# Patient Record
Sex: FEMALE | Race: WHITE | NOT HISPANIC OR LATINO | Employment: OTHER | URBAN - METROPOLITAN AREA
[De-identification: names, ages, dates, MRNs, and addresses within clinical notes are randomized per-mention and may not be internally consistent; named-entity substitution may affect disease eponyms.]

---

## 2018-03-19 ENCOUNTER — OFFICE VISIT (OUTPATIENT)
Dept: INTERNAL MEDICINE | Facility: CLINIC | Age: 53
End: 2018-03-19

## 2018-03-19 VITALS
HEIGHT: 65 IN | WEIGHT: 150.4 LBS | SYSTOLIC BLOOD PRESSURE: 140 MMHG | DIASTOLIC BLOOD PRESSURE: 84 MMHG | BODY MASS INDEX: 25.06 KG/M2 | TEMPERATURE: 98.7 F

## 2018-03-19 DIAGNOSIS — J45.30 MILD PERSISTENT ASTHMA WITHOUT COMPLICATION: ICD-10-CM

## 2018-03-19 DIAGNOSIS — E03.9 HYPOTHYROIDISM (ACQUIRED): Primary | ICD-10-CM

## 2018-03-19 LAB
25(OH)D3 SERPL-MCNC: 34.5 NG/ML
ALBUMIN SERPL-MCNC: 4.6 G/DL (ref 3.2–4.8)
ALBUMIN/GLOB SERPL: 2.1 G/DL (ref 1.5–2.5)
ALP SERPL-CCNC: 61 U/L (ref 25–100)
ALT SERPL W P-5'-P-CCNC: 40 U/L (ref 7–40)
ANION GAP SERPL CALCULATED.3IONS-SCNC: 11 MMOL/L (ref 3–11)
AST SERPL-CCNC: 25 U/L (ref 0–33)
BASOPHILS # BLD AUTO: 0.05 10*3/MM3 (ref 0–0.2)
BASOPHILS NFR BLD AUTO: 0.8 % (ref 0–1)
BILIRUB SERPL-MCNC: 0.4 MG/DL (ref 0.3–1.2)
BUN BLD-MCNC: 11 MG/DL (ref 9–23)
BUN/CREAT SERPL: 13.8 (ref 7–25)
CALCIUM SPEC-SCNC: 9 MG/DL (ref 8.7–10.4)
CHLORIDE SERPL-SCNC: 101 MMOL/L (ref 99–109)
CO2 SERPL-SCNC: 29 MMOL/L (ref 20–31)
CREAT BLD-MCNC: 0.8 MG/DL (ref 0.6–1.3)
DEPRECATED RDW RBC AUTO: 47.1 FL (ref 37–54)
EOSINOPHIL # BLD AUTO: 0.05 10*3/MM3 (ref 0–0.3)
EOSINOPHIL NFR BLD AUTO: 0.8 % (ref 0–3)
ERYTHROCYTE [DISTWIDTH] IN BLOOD BY AUTOMATED COUNT: 14.2 % (ref 11.3–14.5)
GFR SERPL CREATININE-BSD FRML MDRD: 75 ML/MIN/1.73
GLOBULIN UR ELPH-MCNC: 2.2 GM/DL
GLUCOSE BLD-MCNC: 97 MG/DL (ref 70–100)
HCT VFR BLD AUTO: 45.7 % (ref 34.5–44)
HGB BLD-MCNC: 14.1 G/DL (ref 11.5–15.5)
IMM GRANULOCYTES # BLD: 0 10*3/MM3 (ref 0–0.03)
IMM GRANULOCYTES NFR BLD: 0 % (ref 0–0.6)
LYMPHOCYTES # BLD AUTO: 2.2 10*3/MM3 (ref 0.6–4.8)
LYMPHOCYTES NFR BLD AUTO: 34.4 % (ref 24–44)
MCH RBC QN AUTO: 27.9 PG (ref 27–31)
MCHC RBC AUTO-ENTMCNC: 30.9 G/DL (ref 32–36)
MCV RBC AUTO: 90.5 FL (ref 80–99)
MONOCYTES # BLD AUTO: 0.84 10*3/MM3 (ref 0–1)
MONOCYTES NFR BLD AUTO: 13.1 % (ref 0–12)
NEUTROPHILS # BLD AUTO: 3.26 10*3/MM3 (ref 1.5–8.3)
NEUTROPHILS NFR BLD AUTO: 50.9 % (ref 41–71)
PLATELET # BLD AUTO: 231 10*3/MM3 (ref 150–450)
PMV BLD AUTO: 11.1 FL (ref 6–12)
POTASSIUM BLD-SCNC: 4.3 MMOL/L (ref 3.5–5.5)
PROT SERPL-MCNC: 6.8 G/DL (ref 5.7–8.2)
RBC # BLD AUTO: 5.05 10*6/MM3 (ref 3.89–5.14)
SODIUM BLD-SCNC: 141 MMOL/L (ref 132–146)
T4 FREE SERPL-MCNC: 1.01 NG/DL (ref 0.89–1.76)
TSH SERPL DL<=0.05 MIU/L-ACNC: 0.54 MIU/ML (ref 0.35–5.35)
WBC NRBC COR # BLD: 6.4 10*3/MM3 (ref 3.5–10.8)

## 2018-03-19 PROCEDURE — 86376 MICROSOMAL ANTIBODY EACH: CPT | Performed by: FAMILY MEDICINE

## 2018-03-19 PROCEDURE — 80053 COMPREHEN METABOLIC PANEL: CPT | Performed by: FAMILY MEDICINE

## 2018-03-19 PROCEDURE — 82607 VITAMIN B-12: CPT | Performed by: FAMILY MEDICINE

## 2018-03-19 PROCEDURE — 84443 ASSAY THYROID STIM HORMONE: CPT | Performed by: FAMILY MEDICINE

## 2018-03-19 PROCEDURE — 84439 ASSAY OF FREE THYROXINE: CPT | Performed by: FAMILY MEDICINE

## 2018-03-19 PROCEDURE — 82306 VITAMIN D 25 HYDROXY: CPT | Performed by: FAMILY MEDICINE

## 2018-03-19 PROCEDURE — 99204 OFFICE O/P NEW MOD 45 MIN: CPT | Performed by: FAMILY MEDICINE

## 2018-03-19 PROCEDURE — 85025 COMPLETE CBC W/AUTO DIFF WBC: CPT | Performed by: FAMILY MEDICINE

## 2018-03-19 PROCEDURE — 86800 THYROGLOBULIN ANTIBODY: CPT | Performed by: FAMILY MEDICINE

## 2018-03-19 RX ORDER — LEVOTHYROXINE SODIUM 0.05 MG/1
TABLET ORAL
Qty: 90 TABLET | Refills: 0 | Status: SHIPPED | OUTPATIENT
Start: 2018-03-19 | End: 2018-04-30 | Stop reason: SDUPTHER

## 2018-03-19 RX ORDER — BUPROPION HYDROCHLORIDE 150 MG/1
1 TABLET, EXTENDED RELEASE ORAL 2 TIMES DAILY
COMMUNITY
Start: 2018-01-13 | End: 2018-04-30 | Stop reason: SDUPTHER

## 2018-03-19 RX ORDER — LEVOTHYROXINE, LIOTHYRONINE 9.5; 2.25 UG/1; UG/1
1 TABLET ORAL 3 TIMES DAILY
COMMUNITY
Start: 2018-01-13 | End: 2018-03-19

## 2018-03-19 NOTE — PATIENT INSTRUCTIONS
"1. ENDO- hypothyroid- will change pt to synthroid (T4). Discussed the benefits of moving away from the porcine thyroid products. If indicated, may add cytomel (T3). Will start with synthroid 50. Will get baseline labs today with TSH, free T4 and thyroid antibodies. See below (A).  2. RESP- basic education today. Asthma Action Plan provided in writing. See below (B)  3. RECHECK- 6wk for thyroid and address other issues.         A. Patient education regarding hypothyroidism provided today in layman's terms. Pt advised regarding the location and function of the thyroid (thyroid hormone as a regulator for other body systems). Discussed common signs or symptoms of low thyroid including fatigue, hair loss, weight gain, hoarseness, depression, slow speech, dry skin, brittle nails, feeling cold, constipation and joint/ muscle pain. Also discussed the signs/ symptoms of too much (high) thyroid including fatigue, fast heart rate, weight loss, increased appetite, anxiousness, sweating, muscle aches and loose stools.    Discussed that the goal is to be \"euthryoid\", meaning that the patient has the correct amount of thyroid hormone available. This is accomplished using synthetic hormone, levothyroxine (Synthroid). Discussed that decisions regarding the dose of levothyroxine are made using a lab called TSH (thyroid stimulating hormone) and patient symptoms. The patient is also advised of the importance of taking levothyroxine correctly (on an empty stomach and waiting 1 hour before eating, drinking or taking other medicines) to ensure adequate absorption of the medicine.     B. Asthma education today that asthma is an allergic type reaction in the lungs. Allergies can affect the sinuses (hay fever), lungs (asthma) or skin (atopic dermatitis or eczema). If someone has one area of allergy, they are more likely to have the others as well.    With asthma there are 2 key issues:  1. Spasm in the airways causing wheezing and making it " "difficult to breathe.  2. Swelling the in the airways causing a smaller than usual airway.    Treatments are aimed at various aspects:  1. Albuterol (nebulizer or hand held inhaler- proventil, ventolin, proair, xopenex) is used to stop the airway spasm immediately. It is not long acting but it is fast so it is used for \"rescue\".   2. Inhaled steroids relieve the swelling in the airways. This can take time, often up to 8 hours to start to work so is not for \"rescue\" but can be more effective when there is swelling occurring. These can also be combined with a long acting \"albuterol\" to address both issues; again, this is not for rescue but for more powerful, long term control.  3. Spiriva is an inhaled anticholinergic can help stop airway spasm and over production of mucous/ phlegm.   4. Montelukast (singulair) is a leukotriene blocker and is trying to block the allergy reaction in general. It is taken as a pill can be used for any type of allergy, not just asthma.     Asthma treatment options are determined by the amount of problem a person is having. There are many ways to combine the medicines to simplify things. You should have an Asthma Action Plan in writing that you refer to for specifics on your asthma meds.  "

## 2018-03-19 NOTE — PROGRESS NOTES
"Subjective   Rebeka Harding is a 53 y.o. female.     History of Present Illness   New patient, here to establish. Hx migraine, treated with imitrex in past.     ENDO- hypothyroid- currently on NPThroid. Today pt reports she was diagnosed 4/2017. Was having fatigue, mental slowing, joint pain, etc. Was given HRT which seemed to help at first but then she got worse again with added issues of hair loss and hoarseness. Had normal thyroid labs at her PCP but borderline at [facility] ([facility] Was treated there and all of her symptoms improved. Is on \"natural\" thyroid. Brand was changed due to insurance and she did not feel as well and then stopped the HRT due to cost. Now her symptoms are back. She is taking all 3 doses \"fasting\". Both parents had low thyroid.     RESP- asthma- pt currently on mometasone inhaler with prn ventolin. Today pt reports that in 2007 she had a fungal LLL infection and bronchiectasis. Has had asthma since then.  Is sensitive to aerosol, smoking, etc. Works outside and will use the mometasone 1-2x/wk during peak pollen season when wheezing.     PSYCH- depression with anxiety, currently on wellbutrin 150 SR bid. Has done very well on this long term. Did not do well with the XL as it did not last a full 24 hr.    ORTHO- arthritis- currently on voltaren gel. Pt very active. Has a h/o right knee arthroscopy with menisectomy.     The following portions of the patient's history were reviewed and updated as appropriate: current medications, past family history, past medical history, past social history, past surgical history and problem list.    Review of Systems   Constitutional: Positive for fatigue.   HENT: Positive for hearing loss, rhinorrhea and voice change.    Eyes: Positive for redness and itching.   Cardiovascular: Negative for chest pain.   Gastrointestinal: Positive for constipation. Negative for abdominal distention and abdominal pain.   Endocrine: Positive for polydipsia. " "  Genitourinary: Positive for dysuria and frequency.   Musculoskeletal: Positive for arthralgias, joint swelling and myalgias.        Chronic pain   Skin: Negative for color change.        Change in mole   Neurological: Positive for weakness. Negative for tremors, speech difficulty and headaches.   Psychiatric/Behavioral: Negative for agitation and confusion. The patient is nervous/anxious.    All other systems reviewed and are negative.        Current Outpatient Prescriptions:   •  Albuterol Sulfate (VENTOLIN HFA IN), Inhale., Disp: , Rfl:   •  diclofenac (VOLTAREN) 1 % gel gel, Apply 4 g topically 4 (Four) Times a Day As Needed., Disp: , Rfl:   •  Mometasone Furoate 100 MCG/ACT aerosol, Inhale., Disp: , Rfl:   •  buPROPion SR (WELLBUTRIN SR) 150 MG 12 hr tablet, Take 1 tablet by mouth 2 (Two) Times a Day., Disp: , Rfl:   •  levothyroxine (SYNTHROID, LEVOTHROID) 50 MCG tablet, Take 1 tab po qam fasting, wait 1hr for food or other meds. BRAND ONLY, Disp: 90 tablet, Rfl: 0    Objective     /84   Temp 98.7 °F (37.1 °C)   Ht 165.1 cm (65\")   Wt 68.2 kg (150 lb 6.4 oz)   BMI 25.03 kg/m²     Physical Exam   Constitutional: She is oriented to person, place, and time. She appears well-developed and well-nourished.   HENT:   Right Ear: Tympanic membrane and ear canal normal.   Left Ear: Tympanic membrane and ear canal normal.   Mouth/Throat: Oropharynx is clear and moist.   Eyes: Conjunctivae and EOM are normal. Pupils are equal, round, and reactive to light.   Neck: No thyromegaly present.   Cardiovascular: Normal rate and regular rhythm.    Pulmonary/Chest: Effort normal and breath sounds normal.   Neurological: She is alert and oriented to person, place, and time.   Skin: Skin is warm and dry.   Psychiatric: She has a normal mood and affect.   Vitals reviewed.      Assessment/Plan   Rebeka was seen today for establish care.    Diagnoses and all orders for this visit:    Hypothyroidism (acquired)  -     " levothyroxine (SYNTHROID, LEVOTHROID) 50 MCG tablet; Take 1 tab po qam fasting, wait 1hr for food or other meds. BRAND ONLY  -     Vitamin B12  -     Vitamin D 25 Hydroxy  -     TSH  -     T4, Free  -     Thyroid Antibodies  -     CBC & Differential  -     Comprehensive Metabolic Panel    Mild persistent asthma without complication        1. ENDO- hypothyroid- will change pt to synthroid (T4). Discussed the benefits of moving away from the porcine thyroid products. If indicated, may add cytomel (T3). Will start with synthroid 50. Will get baseline labs today with TSH, free T4 and thyroid antibodies. Will also do fatigue labs. See below (A).  2. RESP- basic education today. Asthma Action Plan provided in writing. See below (B)  3. RECHECK- 6wk for thyroid and address other issues.

## 2018-03-21 LAB
THYROGLOB AB SERPL-ACNC: <1 IU/ML (ref 0–0.9)
THYROPEROXIDASE AB SERPL-ACNC: 17 IU/ML (ref 0–34)

## 2018-03-22 LAB — VIT B12 BLD-MCNC: 564 PG/ML (ref 211–911)

## 2018-04-30 ENCOUNTER — OFFICE VISIT (OUTPATIENT)
Dept: INTERNAL MEDICINE | Facility: CLINIC | Age: 53
End: 2018-04-30

## 2018-04-30 VITALS
WEIGHT: 148.6 LBS | TEMPERATURE: 97.7 F | DIASTOLIC BLOOD PRESSURE: 76 MMHG | SYSTOLIC BLOOD PRESSURE: 122 MMHG | BODY MASS INDEX: 24.76 KG/M2 | HEIGHT: 65 IN

## 2018-04-30 DIAGNOSIS — J30.9 CHRONIC ALLERGIC RHINITIS, UNSPECIFIED SEASONALITY, UNSPECIFIED TRIGGER: ICD-10-CM

## 2018-04-30 DIAGNOSIS — G43.909 MIGRAINE SYNDROME: ICD-10-CM

## 2018-04-30 DIAGNOSIS — E03.9 HYPOTHYROIDISM (ACQUIRED): Primary | ICD-10-CM

## 2018-04-30 DIAGNOSIS — F41.8 DEPRESSION WITH ANXIETY: ICD-10-CM

## 2018-04-30 LAB
T4 FREE SERPL-MCNC: 1.14 NG/DL (ref 0.89–1.76)
TSH SERPL DL<=0.05 MIU/L-ACNC: 0.35 MIU/ML (ref 0.35–5.35)

## 2018-04-30 PROCEDURE — 99214 OFFICE O/P EST MOD 30 MIN: CPT | Performed by: FAMILY MEDICINE

## 2018-04-30 PROCEDURE — 84439 ASSAY OF FREE THYROXINE: CPT | Performed by: FAMILY MEDICINE

## 2018-04-30 PROCEDURE — 84443 ASSAY THYROID STIM HORMONE: CPT | Performed by: FAMILY MEDICINE

## 2018-04-30 RX ORDER — BUPROPION HYDROCHLORIDE 150 MG/1
150 TABLET, EXTENDED RELEASE ORAL 2 TIMES DAILY
Qty: 180 TABLET | Refills: 1 | Status: SHIPPED | OUTPATIENT
Start: 2018-04-30 | End: 2018-12-11 | Stop reason: SDUPTHER

## 2018-04-30 RX ORDER — LEVOTHYROXINE SODIUM 0.05 MG/1
TABLET ORAL
Qty: 90 TABLET | Refills: 0 | Status: SHIPPED | OUTPATIENT
Start: 2018-04-30 | End: 2018-07-30 | Stop reason: SDUPTHER

## 2018-04-30 RX ORDER — MOMETASONE FUROATE 1 MG/ML
SOLUTION TOPICAL DAILY
Qty: 90 ML | Refills: 1 | Status: SHIPPED | OUTPATIENT
Start: 2018-04-30 | End: 2019-05-08 | Stop reason: SDUPTHER

## 2018-04-30 RX ORDER — SUMATRIPTAN 100 MG/1
TABLET, FILM COATED ORAL
Qty: 36 TABLET | Refills: 1 | Status: SHIPPED | OUTPATIENT
Start: 2018-04-30

## 2018-04-30 RX ORDER — FLUTICASONE PROPIONATE 50 MCG
2 SPRAY, SUSPENSION (ML) NASAL DAILY
Qty: 3 BOTTLE | Refills: 0 | Status: SHIPPED | OUTPATIENT
Start: 2018-04-30 | End: 2020-10-28

## 2018-06-28 DIAGNOSIS — E03.9 HYPOTHYROIDISM (ACQUIRED): ICD-10-CM

## 2018-06-28 RX ORDER — LEVOTHYROXINE SODIUM 0.05 MG/1
TABLET ORAL
Qty: 90 TABLET | Refills: 0 | Status: SHIPPED | OUTPATIENT
Start: 2018-06-28 | End: 2018-09-11 | Stop reason: SDUPTHER

## 2018-07-30 ENCOUNTER — OFFICE VISIT (OUTPATIENT)
Dept: INTERNAL MEDICINE | Facility: CLINIC | Age: 53
End: 2018-07-30

## 2018-07-30 VITALS
DIASTOLIC BLOOD PRESSURE: 76 MMHG | BODY MASS INDEX: 24.49 KG/M2 | SYSTOLIC BLOOD PRESSURE: 118 MMHG | TEMPERATURE: 97.9 F | WEIGHT: 147 LBS | HEIGHT: 65 IN

## 2018-07-30 DIAGNOSIS — B00.9 HERPES SIMPLEX: ICD-10-CM

## 2018-07-30 DIAGNOSIS — J45.30 MILD PERSISTENT ASTHMA WITHOUT COMPLICATION: ICD-10-CM

## 2018-07-30 DIAGNOSIS — B96.89 ACUTE BACTERIAL SINUSITIS: ICD-10-CM

## 2018-07-30 DIAGNOSIS — G43.909 MIGRAINE SYNDROME: ICD-10-CM

## 2018-07-30 DIAGNOSIS — M19.90 ARTHRITIS: ICD-10-CM

## 2018-07-30 DIAGNOSIS — E03.9 HYPOTHYROIDISM (ACQUIRED): Primary | ICD-10-CM

## 2018-07-30 DIAGNOSIS — J01.90 ACUTE BACTERIAL SINUSITIS: ICD-10-CM

## 2018-07-30 PROCEDURE — 99214 OFFICE O/P EST MOD 30 MIN: CPT | Performed by: FAMILY MEDICINE

## 2018-07-30 RX ORDER — MELOXICAM 15 MG/1
TABLET ORAL
Qty: 30 TABLET | Refills: 0 | Status: SHIPPED | OUTPATIENT
Start: 2018-07-30 | End: 2019-05-08 | Stop reason: SDUPTHER

## 2018-07-30 RX ORDER — LIOTHYRONINE SODIUM 5 UG/1
5 TABLET ORAL DAILY
Qty: 90 TABLET | Refills: 0 | Status: SHIPPED | OUTPATIENT
Start: 2018-07-30 | End: 2018-09-11 | Stop reason: SDUPTHER

## 2018-07-30 RX ORDER — VALACYCLOVIR HYDROCHLORIDE 500 MG/1
500 TABLET, FILM COATED ORAL DAILY
Qty: 30 TABLET | Refills: 0 | Status: SHIPPED | OUTPATIENT
Start: 2018-07-30 | End: 2018-09-11 | Stop reason: SDUPTHER

## 2018-07-30 RX ORDER — FLUCONAZOLE 150 MG/1
150 TABLET ORAL ONCE
Qty: 1 TABLET | Refills: 0 | Status: SHIPPED | OUTPATIENT
Start: 2018-07-30 | End: 2018-07-30

## 2018-07-30 NOTE — PATIENT INSTRUCTIONS
1. ENDO- hypothyroid- will keep the synthroid generic at 50 and add cytomel 5. Discussed that we may consider going to brand on the synthroid if needed. Labs at next visit.  2. RESP- asthma, sinusitis- asthma stable. Will treat the sinus infection with biaxin and cover with diflucan.  3. NEURO- migraine- did well with imitrex with no frequent issues; keep imitrex on hand for as needed use.  4. ORTHO arthritis- Discussed that the joint pain may be related to thyroid or arthritis. Will do a trial with mobic. Can take it every day for 5 days and then go to as needed. Can still use voltaren gel on her knee.  5. DERM- herpes simplex. Education provided. Will treat with valtrex as needed. Advised to take 2 tabs twice a day x1 day as needed. May consider suppression.   6. RECHECK- 6wk (TSH, free T4)

## 2018-07-30 NOTE — PROGRESS NOTES
Subjective   Rebeka Harding is a 53 y.o. female.     History of Present Illness   Here for 3mo recheck. Last seen 4/30/18 for a 6wk recheck. Pt was seen 3/19/18 as a new patient. Lab 4/30/18 demo TSH 0.349 and free T4 1.14 on synthroid 50. Lab 3/19/18 demo normal CBC, CMP, B12 and vit D. Had neg thyroid abs, TSH 0.537 and free T4 1.01 on armour thyroid.      1.ENDO- hypothyroid diagnosed 4/2017- initially on [facility]roid by [facility]. Was having fatigue, mental slowing, joint pain, etc. Did not respond to HRT but did to combo HRT and NPthroid. At her initial visit here she was off HRT. Was changed from NPthroid to synthroid 50. Used generic due to insurance. Did have a little increase in energy; her brain fog, hair and nails have not improved. T4 at goal, TSH slightly suppressed. Advised a 3mo trial and then consideration of adding cytomel if needed. Today she reports she is still having joint and muscle pain. Is very active and fit and eats well but has random muscle pain.      2.RESP- asthma- post LLL fungal pneumonia 2007 with subsequent bronchiectasis. Has done well with elocon for eczema. Was given asthma action plan in writing.  At last visit was having sinus allergies and was not responding to claritin D. Was advised to avoid decongestants and was given Flonase. Today she reports she had a cold and it progressed into a sinus infection with cheek pain with yellow-orange discharge. Otherwise her asthma has been doing well and she has not needed her rescue more than 2x/mo.       3. NEURO- migraine. Pt was treated in past with imitrex and was having recurrence of HA; 3wk of dull, posterior HA; taking a lot of tylenol. Was given imitrex and advised to use even for mild migraine. Today pt reports she has only had to use the imitrex x2. She did well with it, even with a severe HA that she woke up with.      4. ORTHO- arthritis- currently on voltaren gel. Pt very active. Has a h/o right knee arthroscopy with  menisectomy. 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 Is hurting more in her right knee. Is taking advil and tylenol on occasion.    5. DERM- today pt reports she has recurrent blisters on her left elbow. Is painful and feels like nerve. Lasts up to 2wk. Occurs almost every month. Had a hysterectomy but has her ovaries.     6. PSYCH- depression with anxiety, currently on wellbutrin 150 SR bid. Has done very well on this long term. Did not do well with the XL as it did not last a full 24 hr.       The following portions of the patient's history were reviewed and updated as appropriate: current medications, past family history, past medical history, past social history, past surgical history and problem list.    Review of Systems   HENT: Positive for congestion, postnasal drip, rhinorrhea and sinus pressure.    Respiratory: Positive for cough.    Cardiovascular: Negative for chest pain.   Gastrointestinal: Negative for abdominal distention and abdominal pain.   Musculoskeletal:        Right knee pain   Skin: Negative for color change.   Neurological: Negative for tremors, speech difficulty and headaches.   Psychiatric/Behavioral: Negative for agitation and confusion.   All other systems reviewed and are negative.        Current Outpatient Prescriptions:   •  Albuterol Sulfate (VENTOLIN HFA IN), Inhale., Disp: , Rfl:   •  buPROPion SR (WELLBUTRIN SR) 150 MG 12 hr tablet, Take 1 tablet by mouth 2 (Two) Times a Day., Disp: 180 tablet, Rfl: 1  •  clarithromycin XL (BIAXIN XL) 500 MG 24 hr tablet, Take 2 tablets by mouth Daily., Disp: 14 tablet, Rfl: 0  •  diclofenac (VOLTAREN) 1 % gel gel, Apply 4 g topically 4 (Four) Times a Day As Needed., Disp: , Rfl:   •  fluconazole (DIFLUCAN) 150 MG tablet, Take 1 tablet by mouth 1 (One) Time for 1 dose., Disp: 1 tablet, Rfl: 0  •  fluticasone (FLONASE) 50 MCG/ACT nasal spray, 2 sprays into each nostril Daily., Disp: 3 bottle, Rfl: 0  •  levothyroxine (SYNTHROID, LEVOTHROID)  "50 MCG tablet, TAKE ONE TABLET BY MOUTH IN THE MORNING. WAIT 1 HOUR FOR FOOD OR OTHER MEDS , Disp: 90 tablet, Rfl: 0  •  liothyronine (CYTOMEL) 5 MCG tablet, Take 1 tablet by mouth Daily. Take with synthroid., Disp: 90 tablet, Rfl: 0  •  meloxicam (MOBIC) 15 MG tablet, 1 tab po qd prn pain or inflammation, Disp: 30 tablet, Rfl: 0  •  mometasone (ELOCON) 0.1 % lotion, Apply  topically Daily., Disp: 90 mL, Rfl: 1  •  SUMAtriptan (IMITREX) 100 MG tablet, Take 1 tab po prn migraine. May repeat at 2 hr. Max 2 in 24 hr., Disp: 36 tablet, Rfl: 1  •  valACYclovir (VALTREX) 500 MG tablet, Take 1 tablet by mouth Daily., Disp: 30 tablet, Rfl: 0    Objective     /76   Temp 97.9 °F (36.6 °C)   Ht 165.1 cm (65\")   Wt 66.7 kg (147 lb)   BMI 24.46 kg/m²     Physical Exam   Constitutional: She is oriented to person, place, and time. She appears well-developed and well-nourished.   HENT:   Right Ear: Tympanic membrane and ear canal normal.   Left Ear: Tympanic membrane and ear canal normal.   Mouth/Throat: Oropharynx is clear and moist.   Eyes: Pupils are equal, round, and reactive to light. Conjunctivae and EOM are normal.   Neck: No thyromegaly present.   Cardiovascular: Normal rate and regular rhythm.    Pulmonary/Chest: Effort normal and breath sounds normal.   Neurological: She is alert and oriented to person, place, and time.   Skin: Skin is warm and dry.   Psychiatric: She has a normal mood and affect.   Vitals reviewed.      Assessment/Plan   Rebeka was seen today for follow-up.    Diagnoses and all orders for this visit:    Hypothyroidism (acquired)  -     liothyronine (CYTOMEL) 5 MCG tablet; Take 1 tablet by mouth Daily. Take with synthroid.    Mild persistent asthma without complication    Acute bacterial sinusitis  -     clarithromycin XL (BIAXIN XL) 500 MG 24 hr tablet; Take 2 tablets by mouth Daily.  -     fluconazole (DIFLUCAN) 150 MG tablet; Take 1 tablet by mouth 1 (One) Time for 1 dose.    Migraine " syndrome    Arthritis  -     meloxicam (MOBIC) 15 MG tablet; 1 tab po qd prn pain or inflammation    Herpes simplex  -     valACYclovir (VALTREX) 500 MG tablet; Take 1 tablet by mouth Daily.        1. ENDO- hypothyroid- will keep the synthroid generic at 50 and add cytomel 5. Discussed that we may consider going to brand on the synthroid if needed. Labs at next visit.  2. RESP- asthma, sinusitis- asthma stable. Will treat the sinus infection with biaxin and cover with diflucan.  3. NEURO- migraine- did well with imitrex with no frequent issues; keep imitrex on hand for as needed use.  4. ORTHO arthritis- Discussed that the joint pain may be related to thyroid or arthritis. Will do a trial with mobic. Can take it every day for 5 days and then go to as needed. Can still use voltaren gel on her knee.  5. DERM- herpes simplex. Education provided. Will treat with valtrex as needed. Advised to take 2 tabs twice a day x1 day as needed. May consider suppression.   6. RECHECK- 6wk (TSH, free T4)

## 2018-09-11 ENCOUNTER — OFFICE VISIT (OUTPATIENT)
Dept: INTERNAL MEDICINE | Facility: CLINIC | Age: 53
End: 2018-09-11

## 2018-09-11 VITALS
TEMPERATURE: 97.8 F | BODY MASS INDEX: 24.76 KG/M2 | HEIGHT: 65 IN | DIASTOLIC BLOOD PRESSURE: 74 MMHG | WEIGHT: 148.6 LBS | SYSTOLIC BLOOD PRESSURE: 118 MMHG

## 2018-09-11 DIAGNOSIS — E03.9 HYPOTHYROIDISM (ACQUIRED): Primary | ICD-10-CM

## 2018-09-11 DIAGNOSIS — B00.9 HERPES SIMPLEX: ICD-10-CM

## 2018-09-11 DIAGNOSIS — M19.90 ARTHRITIS: ICD-10-CM

## 2018-09-11 PROCEDURE — 99214 OFFICE O/P EST MOD 30 MIN: CPT | Performed by: FAMILY MEDICINE

## 2018-09-11 RX ORDER — VALACYCLOVIR HYDROCHLORIDE 500 MG/1
500 TABLET, FILM COATED ORAL DAILY
Qty: 90 TABLET | Refills: 1 | Status: SHIPPED | OUTPATIENT
Start: 2018-09-11 | End: 2019-06-10 | Stop reason: SDUPTHER

## 2018-09-11 RX ORDER — LIOTHYRONINE SODIUM 5 UG/1
5 TABLET ORAL DAILY
Qty: 90 TABLET | Refills: 1 | Status: SHIPPED | OUTPATIENT
Start: 2018-09-11 | End: 2018-12-14 | Stop reason: DRUGHIGH

## 2018-09-11 RX ORDER — LEVOTHYROXINE SODIUM 0.05 MG/1
TABLET ORAL
Qty: 90 TABLET | Refills: 1 | Status: SHIPPED | OUTPATIENT
Start: 2018-09-11 | End: 2018-12-14 | Stop reason: SDUPTHER

## 2018-09-11 NOTE — PROGRESS NOTES
Subjective   Rebeka Harding is a 53 y.o. female.     History of Present Illness   Here for 6wk recheck. Last seen 7/30/18 for 3mo recheck. Pt was seen 3/19/18 as a new patient. Lab 4/30/18 demo TSH 0.349 and free T4 1.14 on synthroid 50. Lab 3/19/18 demo normal CBC, CMP, B12 and vit D. Had neg thyroid abs, TSH 0.537 and free T4 1.01 on armour thyroid.      1.ENDO- hypothyroid diagnosed 4/2017- initially on [facility]roid by an [facility]. Was having fatigue, mental slowing, joint pain, etc. Did not respond to HRT but did to combo HRT and NPthroid. At her initial visit here she was off HRT. Was changed from NPthroid to synthroid 50. Used generic due to insurance. Had some improved energy but no improvement in frain fot. However, her T4 was at goal and her TSH slightly suppressed. Was given addition of cytomel 5. Today she reports that after 4wk she started to feel better with improved joint pain and her hair growing back in. Still feels foggy and tired. This does seem to be cyclic with her monthly.      2. ORTHO- arthritis- currently on voltaren gel. Pt very active. Has a h/o right knee arthroscopy with menisectomy. Has arthritis and the gel was not adequate. Was given Mobic. Today she reports she did not use the mobic much as she has improved.      3. DERM- HSV. Pt had recurrent (monthly) blisters on elbow with neuropathic type pain. Was given Valtrex course. Today she reports she responded well to the valtrex. Is still getting occasional flares.     4. RESP- asthma- post LLL fungal pneumonia 2007 with subsequent bronchiectasis. Has done well with elocon for eczema and Flonase for allergies. Has used her rescue about 2x/mo. Has AAP in writing. Had a sinus infection at her last visit, no asthma flare; was treated with biaxin.    5.NEURO- migraine. Pt was treated in past with imitrex and was having recurrence of HA; 3wk of dull, posterior HA; taking a lot of tylenol. Was given imitrex and did well.   6. PSYCH-  "depression with anxiety, currently on wellbutrin 150 SR bid. Has done very well on this long term. Did not do well with the XL as it did not last a full 24 hr.       The following portions of the patient's history were reviewed and updated as appropriate: current medications, past family history, past medical history, past social history, past surgical history and problem list.    Review of Systems   Cardiovascular: Negative for chest pain.   Gastrointestinal: Negative for abdominal distention and abdominal pain.   Skin: Negative for color change.   Neurological: Negative for tremors, speech difficulty and headaches.   Psychiatric/Behavioral: Negative for agitation and confusion.   All other systems reviewed and are negative.        Current Outpatient Prescriptions:   •  Albuterol Sulfate (VENTOLIN HFA IN), Inhale., Disp: , Rfl:   •  buPROPion SR (WELLBUTRIN SR) 150 MG 12 hr tablet, Take 1 tablet by mouth 2 (Two) Times a Day., Disp: 180 tablet, Rfl: 1  •  diclofenac (VOLTAREN) 1 % gel gel, Apply 4 g topically 4 (Four) Times a Day As Needed., Disp: , Rfl:   •  fluticasone (FLONASE) 50 MCG/ACT nasal spray, 2 sprays into each nostril Daily., Disp: 3 bottle, Rfl: 0  •  levothyroxine (SYNTHROID, LEVOTHROID) 50 MCG tablet, Sig: TAKE ONE TABLET BY MOUTH IN THE MORNING. WAIT 1 HOUR FOR FOOD OR OTHER MEDS, Disp: 90 tablet, Rfl: 1  •  liothyronine (CYTOMEL) 5 MCG tablet, Take 1 tablet by mouth Daily. Take with synthroid., Disp: 90 tablet, Rfl: 1  •  meloxicam (MOBIC) 15 MG tablet, 1 tab po qd prn pain or inflammation, Disp: 30 tablet, Rfl: 0  •  mometasone (ELOCON) 0.1 % lotion, Apply  topically Daily., Disp: 90 mL, Rfl: 1  •  SUMAtriptan (IMITREX) 100 MG tablet, Take 1 tab po prn migraine. May repeat at 2 hr. Max 2 in 24 hr., Disp: 36 tablet, Rfl: 1  •  valACYclovir (VALTREX) 500 MG tablet, Take 1 tablet by mouth Daily., Disp: 90 tablet, Rfl: 1    Objective     /74   Temp 97.8 °F (36.6 °C)   Ht 165.1 cm (65\")   Wt " 67.4 kg (148 lb 9.6 oz)   BMI 24.73 kg/m²     Physical Exam   Constitutional: She is oriented to person, place, and time. She appears well-developed and well-nourished.   HENT:   Right Ear: Tympanic membrane and ear canal normal.   Left Ear: Tympanic membrane and ear canal normal.   Mouth/Throat: Oropharynx is clear and moist.   Eyes: Pupils are equal, round, and reactive to light. Conjunctivae and EOM are normal.   Neck: No thyromegaly present.   Cardiovascular: Normal rate and regular rhythm.    Pulmonary/Chest: Effort normal and breath sounds normal.   Neurological: She is alert and oriented to person, place, and time.   Skin: Skin is warm and dry.   Psychiatric: She has a normal mood and affect.   Vitals reviewed.      Assessment/Plan   Rebeka was seen today for follow-up.    Diagnoses and all orders for this visit:    Hypothyroidism (acquired)  -     levothyroxine (SYNTHROID, LEVOTHROID) 50 MCG tablet; Sig: TAKE ONE TABLET BY MOUTH IN THE MORNING. WAIT 1 HOUR FOR FOOD OR OTHER MEDS  -     liothyronine (CYTOMEL) 5 MCG tablet; Take 1 tablet by mouth Daily. Take with synthroid.    Arthritis    Herpes simplex  -     valACYclovir (VALTREX) 500 MG tablet; Take 1 tablet by mouth Daily.        1. ENDO- hypothyroid- clinically improved. Will defer labs. Will give this dose combo a full 3mo trial. Discussed that if she stays foggy, we may want to reassess her migraines. Can try taking imitrex for situational fogginess.  2. ORTHO- arthritis- improving with thyroid treatment. Ok to use the mobic as needed.  3. DERM- HSV- discussed that if she treats early, ie- before she blisters, to take 2 twice a day x1 day. If already blistered, to use it twice a day for up to a week. If desired, she can take it every for for 6mo to try to completely suppress. Will do RF x6mo in case she chooses to do suppression.   4. RECHECK- 3mo

## 2018-09-11 NOTE — PATIENT INSTRUCTIONS
1. ENDO- hypothyroid- clinically improved. Will defer labs. Will give this dose combo a full 3mo trial. Discussed that if she stays foggy, we may want to reassess her migraines. Can try taking imitrex for situational fogginess.  2. ORTHO- arthritis- improving with thyroid treatment. Ok to use the mobic as needed.  3. DERM- HSV- discussed that if she treats early, ie- before she blisters, to take 2 twice a day x1 day. If already blistered, to use it twice a day for up to a week. If desired, she can take it every for for 6mo to try to completely suppress. Will do RF x6mo in case she chooses to do suppression.   4. RECHECK- 3mo

## 2018-12-11 ENCOUNTER — OFFICE VISIT (OUTPATIENT)
Dept: INTERNAL MEDICINE | Facility: CLINIC | Age: 53
End: 2018-12-11

## 2018-12-11 VITALS
SYSTOLIC BLOOD PRESSURE: 116 MMHG | DIASTOLIC BLOOD PRESSURE: 74 MMHG | BODY MASS INDEX: 25.09 KG/M2 | TEMPERATURE: 98.5 F | HEIGHT: 65 IN | WEIGHT: 150.6 LBS

## 2018-12-11 DIAGNOSIS — E03.9 HYPOTHYROIDISM (ACQUIRED): ICD-10-CM

## 2018-12-11 DIAGNOSIS — J45.30 MILD PERSISTENT ASTHMA WITHOUT COMPLICATION: Primary | ICD-10-CM

## 2018-12-11 DIAGNOSIS — F41.8 DEPRESSION WITH ANXIETY: ICD-10-CM

## 2018-12-11 DIAGNOSIS — J30.9 CHRONIC ALLERGIC RHINITIS: ICD-10-CM

## 2018-12-11 DIAGNOSIS — G43.909 MIGRAINE SYNDROME: ICD-10-CM

## 2018-12-11 DIAGNOSIS — M19.90 ARTHRITIS: ICD-10-CM

## 2018-12-11 LAB
ARTICHOKE IGE QN: 154 MG/DL (ref 0–130)
CHOLEST SERPL-MCNC: 219 MG/DL (ref 0–200)
HDLC SERPL-MCNC: 66 MG/DL (ref 40–60)
T4 FREE SERPL-MCNC: 0.94 NG/DL (ref 0.89–1.76)
TRIGL SERPL-MCNC: 84 MG/DL (ref 0–150)
TSH SERPL DL<=0.05 MIU/L-ACNC: 0.65 MIU/ML (ref 0.35–5.35)

## 2018-12-11 PROCEDURE — 99214 OFFICE O/P EST MOD 30 MIN: CPT | Performed by: FAMILY MEDICINE

## 2018-12-11 PROCEDURE — 84439 ASSAY OF FREE THYROXINE: CPT | Performed by: FAMILY MEDICINE

## 2018-12-11 PROCEDURE — 80061 LIPID PANEL: CPT | Performed by: FAMILY MEDICINE

## 2018-12-11 PROCEDURE — 84443 ASSAY THYROID STIM HORMONE: CPT | Performed by: FAMILY MEDICINE

## 2018-12-11 RX ORDER — BUPROPION HYDROCHLORIDE 150 MG/1
150 TABLET, EXTENDED RELEASE ORAL 2 TIMES DAILY
Qty: 180 TABLET | Refills: 1 | Status: SHIPPED | OUTPATIENT
Start: 2018-12-11 | End: 2019-06-10 | Stop reason: SDUPTHER

## 2018-12-11 NOTE — PROGRESS NOTES
Subjective   Rebeka Harding is a 53 y.o. female.     History of Present Illness   Here for 3mo full routine. Last seen 9/11/18 for 6wk recheck. Was  seen 7/30/18 for 3mo recheck. Pt was seen 3/19/18 as a new patient. Lab 4/30/18 demo TSH 0.349 and free T4 1.14 on synthroid 50. Lab 3/19/18 demo normal CBC, CMP, B12 and vit D. Had neg thyroid abs, TSH 0.537 and free T4 1.01 on armour thyroid.      1.ENDO- hypothyroid diagnosed 4/2017- [facility] on [facility]. Was having fatigue, mental slowing, joint pain, etc. Did not respond to HRT but did to combo HRT and NPthroid. At her initial visit here she was off HRT. Was changed from NPthroid to synthroid 50. Used generic due to insurance. Felt a little better and her T4 was at goal but her TSH slightly suppressed. Was given addition of cytomel 5 and had inproved joint pain and hair growth. Was still having occasional fogginess and fatigue; was advised 3mo full trial. Today she reports she felt well until 1mo ago and now all the symptoms are back including losing hair, joint pain, weight gain, etc.       2. RESP- asthma- post LLL fungal pneumonia 2007 with subsequent bronchiectasis. Has done well with elocon for eczema and Flonase for allergies. Has used her rescue about 2x/mo. Has AAP in writing. Had a sinus infection at her last visit, no asthma flare; was treated with biaxin.  Today pt reports she has a head cold. No asthma flare. Has not needed her rescue since her cruise 11/2018.    3. NEURO- migraine. Pt was treated in past with imitrex and was having recurrence of HA; 3wk of dull, posterior HA. Did well back on prn imitrex. Was advised to try this for occasional feelings of fogginess as these could be minor or aborted migraines. Today she reports she never thought to try the imitrex as she was feeling so well until recently.     4. PSYCH- depression with anxiety, currently on wellbutrin 150 SR bid. Has done very well on this long term. Did not do  well with the XL as it did not last a full 24 hr. Today she reports her mood is still at goal.     5. ORTHO- arthritis- Pt very active. Has a h/o right knee arthroscopy with meniscectomy. Was given Mobic but did not need much as joints improved with thyroid treatment. Today pt reports she is having right knee pain and it is gradually progressing. Has been advised by Ortho that this is OA and progressive. Pt is a exercise certified and doing all the PT already.     6. DERM- HSV. Monthly recurrent blisters on elbow with neuropathic type pain. Was given Valtrex course x1mo and improved. Was still needing occasional prn Valtrex.     The following portions of the patient's history were reviewed and updated as appropriate: current medications, past family history, past medical history, past social history, past surgical history and problem list.    Review of Systems   HENT: Positive for rhinorrhea.    Respiratory: Positive for cough.    Cardiovascular: Negative for chest pain.   Gastrointestinal: Negative for abdominal distention and abdominal pain.   Skin: Negative for color change.   Neurological: Negative for tremors, speech difficulty and headaches.   Psychiatric/Behavioral: Negative for agitation and confusion.   All other systems reviewed and are negative.        Current Outpatient Medications:   •  Albuterol Sulfate (VENTOLIN HFA IN), Inhale., Disp: , Rfl:   •  buPROPion SR (WELLBUTRIN SR) 150 MG 12 hr tablet, Take 1 tablet by mouth 2 (Two) Times a Day., Disp: 180 tablet, Rfl: 1  •  diclofenac (VOLTAREN) 1 % gel gel, Apply 4 g topically 4 (Four) Times a Day As Needed., Disp: , Rfl:   •  fluticasone (FLONASE) 50 MCG/ACT nasal spray, 2 sprays into each nostril Daily., Disp: 3 bottle, Rfl: 0  •  levothyroxine (SYNTHROID, LEVOTHROID) 50 MCG tablet, Sig: TAKE ONE TABLET BY MOUTH IN THE MORNING. WAIT 1 HOUR FOR FOOD OR OTHER MEDS, Disp: 90 tablet, Rfl: 1  •  liothyronine (CYTOMEL) 5 MCG tablet, Take 1 tablet by mouth  "Daily. Take with synthroid., Disp: 90 tablet, Rfl: 1  •  meloxicam (MOBIC) 15 MG tablet, 1 tab po qd prn pain or inflammation, Disp: 30 tablet, Rfl: 0  •  mometasone (ELOCON) 0.1 % lotion, Apply  topically Daily., Disp: 90 mL, Rfl: 1  •  SUMAtriptan (IMITREX) 100 MG tablet, Take 1 tab po prn migraine. May repeat at 2 hr. Max 2 in 24 hr., Disp: 36 tablet, Rfl: 1  •  valACYclovir (VALTREX) 500 MG tablet, Take 1 tablet by mouth Daily., Disp: 90 tablet, Rfl: 1    Objective     /74   Temp 98.5 °F (36.9 °C)   Ht 165.1 cm (65\")   Wt 68.3 kg (150 lb 9.6 oz)   BMI 25.06 kg/m²     Physical Exam   Constitutional: She is oriented to person, place, and time. She appears well-developed and well-nourished.   HENT:   Right Ear: Tympanic membrane and ear canal normal.   Left Ear: Tympanic membrane and ear canal normal.   Mouth/Throat: Oropharynx is clear and moist.   Eyes: Conjunctivae and EOM are normal. Pupils are equal, round, and reactive to light.   Neck: No thyromegaly present.   Cardiovascular: Normal rate and regular rhythm.   Pulmonary/Chest: Effort normal and breath sounds normal.   Neurological: She is alert and oriented to person, place, and time.   Skin: Skin is warm and dry.   Psychiatric: She has a normal mood and affect.   Vitals reviewed.      Assessment/Plan   Rebeka was seen today for follow-up.    Diagnoses and all orders for this visit:    Mild persistent asthma without complication    Chronic allergic rhinitis    Hypothyroidism (acquired)  -     TSH  -     T4, Free  -     Discontinue: clarithromycin XL (BIAXIN XL) 500 MG 24 hr tablet; Take 2 tablets by mouth Daily.  -     Lipid Panel    Migraine syndrome    Depression with anxiety  -     buPROPion SR (WELLBUTRIN SR) 150 MG 12 hr tablet; Take 1 tablet by mouth 2 (Two) Times a Day.    Arthritis        1. ENDO- hypothyroid- discussed that she could be too high or too low on synthroid 50 and cytomel 5. Will check TSH and free T4 now and adjust dose " accordingly. Will not send RF until dose confirmed. Will also check her lipid as this was not done with labs 3/2018. Pt fasting.   2. RESP- asthma and allergies- stable. Will write for biaxin now as she is turning from viral to bacterial with her head cold.   3. NEURO- migraine- stable. Discussed that the fogginess likely related to thyroid.   4. PSYCH- depression with anxiety- mood at goal.   5. ORTHO- arthritis.- will get an xray of her knees now. Discussed returning to Ortho if needed.  6. RECHECK- 3mo

## 2018-12-11 NOTE — PATIENT INSTRUCTIONS
1. ENDO- hypothyroid- discussed that she could be too high or too low on synthroid 50 and cytomel 5. Will check TSH and free T4 now and adjust dose accordingly. Will not send RF until dose confirmed. Will also check her lipid as this was not done with labs 3/2018. Pt fasting.   2. RESP- asthma and allergies- stable. Will write for biaxin now as she is turning from viral to bacterial with her head cold.   3. NEURO- migraine- stable. Discussed that the fogginess likely related to thyroid.   4. PSYCH- depression with anxiety- mood at goal.   5. ORTHO- arthritis.- will get an xray of her knees now. Discussed returning to Ortho if needed.  6. RECHECK- 3mo

## 2018-12-14 ENCOUNTER — TELEPHONE (OUTPATIENT)
Dept: INTERNAL MEDICINE | Facility: CLINIC | Age: 53
End: 2018-12-14

## 2018-12-14 DIAGNOSIS — E03.9 HYPOTHYROIDISM (ACQUIRED): ICD-10-CM

## 2018-12-14 RX ORDER — LIOTHYRONINE SODIUM 25 UG/1
25 TABLET ORAL DAILY
Qty: 90 TABLET | Refills: 0 | Status: SHIPPED | OUTPATIENT
Start: 2018-12-14 | End: 2019-01-13

## 2018-12-14 RX ORDER — LEVOTHYROXINE SODIUM 0.05 MG/1
TABLET ORAL
Qty: 90 TABLET | Refills: 0 | Status: SHIPPED | OUTPATIENT
Start: 2018-12-14 | End: 2019-06-11 | Stop reason: SDUPTHER

## 2018-12-14 NOTE — TELEPHONE ENCOUNTER
Pt advised of lab results and new doses of cytomel. Will send in medication now. Pt advised to keep 3 month recheck and expressed understanding.

## 2019-05-08 DIAGNOSIS — M19.90 ARTHRITIS: ICD-10-CM

## 2019-05-08 DIAGNOSIS — J30.9 CHRONIC ALLERGIC RHINITIS: ICD-10-CM

## 2019-05-08 RX ORDER — MELOXICAM 15 MG/1
TABLET ORAL
Qty: 30 TABLET | Refills: 0 | Status: SHIPPED | OUTPATIENT
Start: 2019-05-08 | End: 2019-06-10 | Stop reason: SDUPTHER

## 2019-05-09 RX ORDER — MOMETASONE FUROATE 1 MG/ML
SOLUTION TOPICAL DAILY
Qty: 90 ML | Refills: 1 | Status: SHIPPED | OUTPATIENT
Start: 2019-05-09 | End: 2019-07-22 | Stop reason: SDUPTHER

## 2019-06-10 ENCOUNTER — OFFICE VISIT (OUTPATIENT)
Dept: INTERNAL MEDICINE | Facility: CLINIC | Age: 54
End: 2019-06-10

## 2019-06-10 VITALS
TEMPERATURE: 98.1 F | BODY MASS INDEX: 25.59 KG/M2 | DIASTOLIC BLOOD PRESSURE: 82 MMHG | WEIGHT: 153.6 LBS | SYSTOLIC BLOOD PRESSURE: 130 MMHG | HEIGHT: 65 IN

## 2019-06-10 DIAGNOSIS — F41.8 DEPRESSION WITH ANXIETY: ICD-10-CM

## 2019-06-10 DIAGNOSIS — M25.561 CHRONIC PAIN OF RIGHT KNEE: ICD-10-CM

## 2019-06-10 DIAGNOSIS — M19.90 ARTHRITIS: ICD-10-CM

## 2019-06-10 DIAGNOSIS — G89.29 CHRONIC PAIN OF RIGHT KNEE: ICD-10-CM

## 2019-06-10 DIAGNOSIS — E03.9 HYPOTHYROIDISM (ACQUIRED): Primary | ICD-10-CM

## 2019-06-10 DIAGNOSIS — Z12.39 BREAST CANCER SCREENING: ICD-10-CM

## 2019-06-10 DIAGNOSIS — B00.9 HERPES SIMPLEX: ICD-10-CM

## 2019-06-10 LAB
T3FREE SERPL-MCNC: 4.82 PG/ML (ref 2–4.4)
T4 FREE SERPL-MCNC: 1.1 NG/DL (ref 0.93–1.7)
TSH SERPL DL<=0.05 MIU/L-ACNC: 1.09 MIU/ML (ref 0.27–4.2)

## 2019-06-10 PROCEDURE — 84481 FREE ASSAY (FT-3): CPT | Performed by: INTERNAL MEDICINE

## 2019-06-10 PROCEDURE — 84443 ASSAY THYROID STIM HORMONE: CPT | Performed by: INTERNAL MEDICINE

## 2019-06-10 PROCEDURE — 84439 ASSAY OF FREE THYROXINE: CPT | Performed by: INTERNAL MEDICINE

## 2019-06-10 PROCEDURE — 99214 OFFICE O/P EST MOD 30 MIN: CPT | Performed by: INTERNAL MEDICINE

## 2019-06-10 RX ORDER — BUPROPION HYDROCHLORIDE 150 MG/1
150 TABLET, EXTENDED RELEASE ORAL 2 TIMES DAILY
Qty: 180 TABLET | Refills: 1 | Status: SHIPPED | OUTPATIENT
Start: 2019-06-10 | End: 2019-06-10 | Stop reason: SDUPTHER

## 2019-06-10 RX ORDER — BUPROPION HYDROCHLORIDE 150 MG/1
150 TABLET, EXTENDED RELEASE ORAL 2 TIMES DAILY
Qty: 180 TABLET | Refills: 1 | Status: SHIPPED | OUTPATIENT
Start: 2019-06-10 | End: 2020-01-13

## 2019-06-10 RX ORDER — VALACYCLOVIR HYDROCHLORIDE 500 MG/1
500 TABLET, FILM COATED ORAL DAILY
Qty: 90 TABLET | Refills: 3 | Status: SHIPPED | OUTPATIENT
Start: 2019-06-10 | End: 2020-12-02

## 2019-06-10 RX ORDER — MELOXICAM 15 MG/1
TABLET ORAL
Qty: 90 TABLET | Refills: 2 | Status: SHIPPED | OUTPATIENT
Start: 2019-06-10 | End: 2020-03-18

## 2019-06-10 NOTE — PROGRESS NOTES
"Subjective   Rebeka Harding is a 54 y.o. female here for follow-up hypothyroidism, A&D, chronic right knee pain. Hypothyroidism: cytomel was increased to 25mg last visit and she felt jittery and shaky, has been halving them and doing well with that. Feels great from a thyroid standpoint. On wellbutrin for A&D and mood is stable. No negative SE to med. Knee pain: right knee, chronic, achy. Hurts at night to the point of not being able to sleep unless she gets it in just the right position. On Mobic which helps modestly as well as voltaren. Needs refills. Wants to see ortho for the knee as she did see one in the past. Never got any injections but interested in that. Has associated knee swelling if she is on her feet a long period of time.     The following portions of the patient's history were reviewed and updated as appropriate: allergies, current medications, past medical history, past social history and problem list.    Review of Systems:  General: negative  CV: negative  Neuro: negative  Psych: A&D  MSK: knee pain  Endo: negative    Objective   /82 (BP Location: Left arm, Patient Position: Sitting, Cuff Size: Adult)   Temp 98.1 °F (36.7 °C) (Temporal)   Ht 165.1 cm (65\")   Wt 69.7 kg (153 lb 9.6 oz)   BMI 25.56 kg/m²     Physical Exam    Assessment/Plan   Rebeka was seen today for hypothyroidism, anxiety and depression.    Diagnoses and all orders for this visit:    Hypothyroidism (acquired)  -     TSH  -     T4, Free  -     T3, Free    Depression with anxiety  -     buPROPion SR (WELLBUTRIN SR) 150 MG 12 hr tablet; Take 1 tablet by mouth 2 (Two) Times a Day.    Chronic pain of right knee  -     Ambulatory Referral to Orthopedic Surgery    Arthritis  -     meloxicam (MOBIC) 15 MG tablet; TAKE ONE TABLET BY MOUTH DAILY AS NEEDED FOR PAIN OR INFLAMMATION    Breast cancer screening  -     Mammo screening digital tomosynthesis bilateral w CAD    Herpes simplex  -     valACYclovir (VALTREX) 500 MG tablet; " Take 1 tablet by mouth Daily.    Other orders  -     diclofenac (VOLTAREN) 1 % gel gel; Apply 4 g topically to the appropriate area as directed 4 (Four) Times a Day As Needed (pain).

## 2019-06-11 DIAGNOSIS — E03.9 HYPOTHYROIDISM (ACQUIRED): ICD-10-CM

## 2019-06-11 RX ORDER — LEVOTHYROXINE SODIUM 0.05 MG/1
TABLET ORAL
Qty: 90 TABLET | Refills: 1 | Status: SHIPPED | OUTPATIENT
Start: 2019-06-11 | End: 2019-07-24 | Stop reason: SDUPTHER

## 2019-07-22 DIAGNOSIS — J30.9 CHRONIC ALLERGIC RHINITIS: ICD-10-CM

## 2019-07-22 RX ORDER — MOMETASONE FUROATE 1 MG/ML
SOLUTION TOPICAL DAILY
Qty: 90 ML | Refills: 1 | Status: SHIPPED | OUTPATIENT
Start: 2019-07-22

## 2019-07-24 DIAGNOSIS — E03.9 HYPOTHYROIDISM (ACQUIRED): ICD-10-CM

## 2019-07-24 RX ORDER — LEVOTHYROXINE SODIUM 0.05 MG/1
TABLET ORAL
Qty: 90 TABLET | Refills: 1 | Status: SHIPPED | OUTPATIENT
Start: 2019-07-24 | End: 2020-01-20

## 2019-10-29 DIAGNOSIS — M25.561 CHRONIC PAIN OF RIGHT KNEE: Primary | ICD-10-CM

## 2019-10-29 DIAGNOSIS — G89.29 CHRONIC PAIN OF RIGHT KNEE: Primary | ICD-10-CM

## 2019-11-04 ENCOUNTER — OFFICE VISIT (OUTPATIENT)
Dept: INTERNAL MEDICINE | Facility: CLINIC | Age: 54
End: 2019-11-04

## 2019-11-04 VITALS
BODY MASS INDEX: 24.96 KG/M2 | DIASTOLIC BLOOD PRESSURE: 68 MMHG | TEMPERATURE: 99.1 F | HEIGHT: 65 IN | WEIGHT: 149.8 LBS | SYSTOLIC BLOOD PRESSURE: 117 MMHG

## 2019-11-04 DIAGNOSIS — R29.898 HAND WEAKNESS: ICD-10-CM

## 2019-11-04 DIAGNOSIS — M25.532 LEFT WRIST PAIN: Primary | ICD-10-CM

## 2019-11-04 PROCEDURE — 99213 OFFICE O/P EST LOW 20 MIN: CPT | Performed by: INTERNAL MEDICINE

## 2019-11-04 NOTE — PROGRESS NOTES
"Subjective   Rebeka Harding is a 54 y.o. female here for new left wrist pain.  It has been going on about a month.  Says she lifted a 40 pound bucket to lift over her head and when she did she heard a pop in her left wrist and had pain and weakness.  She thought it was just a strain at the time and that time would heal it.  However, her symptoms have continued.  She has trouble gripping a can, holding a plate on her outstretched hand.  She has some numbness on the left pinky.  She also has some issues with the thumb on that hand as well.  In the morning it will be \"locked in place.\"  She feels her  strength is weak as well.  She has pain in the left thumb radiating into the wrist.  She thinks the overall problem is in the wrist.  She is on Mobic for her knee and that has been helping her wrist as well.  She has an appointment for her knee with [facility] orthopedics on Wednesday.  She would like to stick with an [facility] orthopedics for this problem.    I have reviewed the following portions of the patient's history and confirmed they are accurate: current medications, past medical history, past surgical history and problem list     I have personally completed the patient's review of systems.    Review of Systems:  General: negative  Neuro: See HPI  MSK: See HPI  Skin: Negative    Objective   /68 (BP Location: Left arm, Patient Position: Sitting, Cuff Size: Adult)   Temp 99.1 °F (37.3 °C) (Temporal)   Ht 165.1 cm (65\")   Wt 67.9 kg (149 lb 12.8 oz)   BMI 24.93 kg/m²     Physical Exam   Constitutional: She is oriented to person, place, and time. She appears well-developed and well-nourished.   Pulmonary/Chest: Effort normal.   Musculoskeletal:   Left wrist with tenderness along the thumb and down to the wrist.  No sensory deficit in that area.  No obvious edema or deformity   Neurological: She is alert and oriented to person, place, and time.   Decreased  strength on the left   Skin: Skin is warm and " dry.   No skin changes over the left wrist or hand   Psychiatric: She has a normal mood and affect. Her behavior is normal. Judgment and thought content normal.   Vitals reviewed.      Assessment/Plan   Rebeka was seen today for wrist pain.    Diagnoses and all orders for this visit:    Left wrist pain  -     Ambulatory Referral to Orthopedic Surgery  -New problem.  Continue Mobic for pain relief.  Suspect ligamentous injury    Hand weakness  -     Ambulatory Referral to Orthopedic Surgery             Joaquina Turner MA

## 2019-12-13 ENCOUNTER — RESULTS ENCOUNTER (OUTPATIENT)
Dept: INTERNAL MEDICINE | Facility: CLINIC | Age: 54
End: 2019-12-13

## 2019-12-13 ENCOUNTER — OFFICE VISIT (OUTPATIENT)
Dept: INTERNAL MEDICINE | Facility: CLINIC | Age: 54
End: 2019-12-13

## 2019-12-13 VITALS
BODY MASS INDEX: 25.66 KG/M2 | SYSTOLIC BLOOD PRESSURE: 124 MMHG | HEIGHT: 65 IN | WEIGHT: 154 LBS | TEMPERATURE: 98.1 F | DIASTOLIC BLOOD PRESSURE: 82 MMHG

## 2019-12-13 DIAGNOSIS — R23.8 SKIN IRRITATION: ICD-10-CM

## 2019-12-13 DIAGNOSIS — J01.90 ACUTE SINUSITIS, RECURRENCE NOT SPECIFIED, UNSPECIFIED LOCATION: ICD-10-CM

## 2019-12-13 DIAGNOSIS — E03.9 HYPOTHYROIDISM (ACQUIRED): Primary | ICD-10-CM

## 2019-12-13 DIAGNOSIS — Z12.11 COLON CANCER SCREENING: ICD-10-CM

## 2019-12-13 DIAGNOSIS — F41.8 DEPRESSION WITH ANXIETY: ICD-10-CM

## 2019-12-13 DIAGNOSIS — Z00.00 HEALTH CARE MAINTENANCE: ICD-10-CM

## 2019-12-13 LAB
ALBUMIN SERPL-MCNC: 4.9 G/DL (ref 3.5–5.2)
ALBUMIN/GLOB SERPL: 1.9 G/DL
ALP SERPL-CCNC: 48 U/L (ref 39–117)
ALT SERPL W P-5'-P-CCNC: 23 U/L (ref 1–33)
ANION GAP SERPL CALCULATED.3IONS-SCNC: 12.5 MMOL/L (ref 5–15)
AST SERPL-CCNC: 18 U/L (ref 1–32)
BILIRUB SERPL-MCNC: 0.4 MG/DL (ref 0.2–1.2)
BUN BLD-MCNC: 14 MG/DL (ref 6–20)
BUN/CREAT SERPL: 17.3 (ref 7–25)
CALCIUM SPEC-SCNC: 9.9 MG/DL (ref 8.6–10.5)
CHLORIDE SERPL-SCNC: 98 MMOL/L (ref 98–107)
CHOLEST SERPL-MCNC: 215 MG/DL (ref 0–200)
CO2 SERPL-SCNC: 29.5 MMOL/L (ref 22–29)
CREAT BLD-MCNC: 0.81 MG/DL (ref 0.57–1)
GFR SERPL CREATININE-BSD FRML MDRD: 74 ML/MIN/1.73
GLOBULIN UR ELPH-MCNC: 2.6 GM/DL
GLUCOSE BLD-MCNC: 95 MG/DL (ref 65–99)
HDLC SERPL-MCNC: 70 MG/DL (ref 40–60)
LDLC SERPL CALC-MCNC: 131 MG/DL (ref 0–100)
LDLC/HDLC SERPL: 1.88 {RATIO}
POTASSIUM BLD-SCNC: 4.5 MMOL/L (ref 3.5–5.2)
PROT SERPL-MCNC: 7.5 G/DL (ref 6–8.5)
SODIUM BLD-SCNC: 140 MMOL/L (ref 136–145)
T3FREE SERPL-MCNC: 3.68 PG/ML (ref 2–4.4)
T4 FREE SERPL-MCNC: 1.21 NG/DL (ref 0.93–1.7)
TRIGL SERPL-MCNC: 68 MG/DL (ref 0–150)
TSH SERPL DL<=0.05 MIU/L-ACNC: 0.87 UIU/ML (ref 0.27–4.2)
VLDLC SERPL-MCNC: 13.6 MG/DL (ref 5–40)

## 2019-12-13 PROCEDURE — 80053 COMPREHEN METABOLIC PANEL: CPT | Performed by: INTERNAL MEDICINE

## 2019-12-13 PROCEDURE — 84443 ASSAY THYROID STIM HORMONE: CPT | Performed by: INTERNAL MEDICINE

## 2019-12-13 PROCEDURE — 80061 LIPID PANEL: CPT | Performed by: INTERNAL MEDICINE

## 2019-12-13 PROCEDURE — 99214 OFFICE O/P EST MOD 30 MIN: CPT | Performed by: INTERNAL MEDICINE

## 2019-12-13 PROCEDURE — 84439 ASSAY OF FREE THYROXINE: CPT | Performed by: INTERNAL MEDICINE

## 2019-12-13 PROCEDURE — 84481 FREE ASSAY (FT-3): CPT | Performed by: INTERNAL MEDICINE

## 2019-12-13 RX ORDER — AMOXICILLIN 500 MG/1
500 CAPSULE ORAL 2 TIMES DAILY
Qty: 10 CAPSULE | Refills: 0 | Status: SHIPPED | OUTPATIENT
Start: 2019-12-13 | End: 2020-10-28

## 2019-12-13 RX ORDER — FLUCONAZOLE 150 MG/1
150 TABLET ORAL ONCE
Qty: 1 TABLET | Refills: 0 | Status: SHIPPED | OUTPATIENT
Start: 2019-12-13 | End: 2019-12-13

## 2019-12-13 NOTE — PROGRESS NOTES
"Subjective   Rebeka Harding is a 54 y.o. female here for follow-up hypothyroidism, anxiety and depression, skin lesion, URI symptoms.  Hypothyroidism: Thinks that her thyroid may not be at goal.  Last time it was checked, a few months ago it was at goal.  She has been having some fatigue and hair loss lately.  She is compliant with her Synthroid.  Anxiety and depression: On Wellbutrin and feels that her mood is stable.  She would like to continue this medication at present dose.  Skin lesion: Has had it for \"years\" and it is along her bra line.  Occasionally it will bleed and be crusty so she would like to have it looked at.  She is also had sinus congestion, pressure, postnasal drip, cough, green nasal discharge for 2 weeks.  Over-the-counter medications have not helped.  She denies any fever, chills.    I have reviewed the following portions of the patient's history and confirmed they are accurate: allergies, current medications, past medical history, past social history and problem list     I have personally completed the patient's review of systems.    Review of Systems:  General: Fatigue  HEENT: See HPI  CV: negative  Respiratory: negative  Neuro: negative  Psych: negative  Endo: Hair loss  Skin: Lesion    Objective   /82 (BP Location: Left arm, Patient Position: Sitting, Cuff Size: Adult)   Temp 98.1 °F (36.7 °C) (Temporal)   Ht 165.1 cm (65\")   Wt 69.9 kg (154 lb)   BMI 25.63 kg/m²     Physical Exam   Constitutional: She is oriented to person, place, and time. She appears well-developed and well-nourished.   HENT:   Head: Normocephalic and atraumatic.   Eyes: Conjunctivae are normal.   Pulmonary/Chest: Effort normal.   Neurological: She is alert and oriented to person, place, and time.   Skin: Skin is warm and dry.   Right thoracic, immediately lateral to the midline spine there is a 3 mm round raised pink/purple lesion with no irritation or irregularity   Psychiatric: She has a normal mood and " affect. Her behavior is normal. Judgment and thought content normal.   Vitals reviewed.      Assessment/Plan   Rebeka was seen today for hypothyroidism, depression and anxiety.    Diagnoses and all orders for this visit:    Hypothyroidism (acquired)  -     TSH  -     T4, Free  -     T3, Free chronic,  -Potentially not at goal with symptoms    Depression with anxiety  -Mood stable, continue current medications    Health care maintenance  -     Lipid Panel  -     Comprehensive Metabolic Panel    Acute sinusitis, recurrence not specified, unspecified location  -     amoxicillin (AMOXIL) 500 MG capsule; Take 1 capsule by mouth 2 (Two) Times a Day.  -New problem requiring treatment    Colon cancer screening  -     Cologuard - Stool, Per Rectum; Future    Skin irritation  -     Ambulatory Referral to Dermatology  -Does not appear malignant, however its location causes irritation so would benefit from removal  -New problem    Other orders  -     fluconazole (DIFLUCAN) 150 MG tablet; Take 1 tablet by mouth 1 (One) Time for 1 dose.             Joaquina Turner MA    Please note that portions of this note were completed with a voice recognition program. Efforts were made to edit the dictations, but occasionally words are mistranscribed.

## 2020-01-10 DIAGNOSIS — F41.8 DEPRESSION WITH ANXIETY: ICD-10-CM

## 2020-01-13 RX ORDER — BUPROPION HYDROCHLORIDE 150 MG/1
TABLET, EXTENDED RELEASE ORAL
Qty: 180 TABLET | Refills: 4 | Status: SHIPPED | OUTPATIENT
Start: 2020-01-13 | End: 2021-01-04 | Stop reason: SDUPTHER

## 2020-01-20 DIAGNOSIS — E03.9 HYPOTHYROIDISM (ACQUIRED): ICD-10-CM

## 2020-01-20 RX ORDER — LEVOTHYROXINE SODIUM 0.05 MG/1
TABLET ORAL
Qty: 90 TABLET | Refills: 4 | Status: SHIPPED | OUTPATIENT
Start: 2020-01-20

## 2020-03-17 DIAGNOSIS — G89.29 CHRONIC PAIN OF RIGHT KNEE: ICD-10-CM

## 2020-03-17 DIAGNOSIS — M25.561 CHRONIC PAIN OF RIGHT KNEE: ICD-10-CM

## 2020-03-17 DIAGNOSIS — G89.29 CHRONIC PAIN OF LEFT KNEE: Primary | ICD-10-CM

## 2020-03-17 DIAGNOSIS — M25.562 CHRONIC PAIN OF LEFT KNEE: Primary | ICD-10-CM

## 2020-03-18 DIAGNOSIS — M19.90 ARTHRITIS: ICD-10-CM

## 2020-03-18 RX ORDER — MELOXICAM 15 MG/1
TABLET ORAL
Qty: 90 TABLET | Refills: 3 | Status: SHIPPED | OUTPATIENT
Start: 2020-03-18 | End: 2021-02-24

## 2020-10-28 ENCOUNTER — OFFICE VISIT (OUTPATIENT)
Dept: INTERNAL MEDICINE | Facility: CLINIC | Age: 55
End: 2020-10-28

## 2020-10-28 VITALS
DIASTOLIC BLOOD PRESSURE: 74 MMHG | WEIGHT: 152 LBS | TEMPERATURE: 97.1 F | HEIGHT: 65 IN | BODY MASS INDEX: 25.33 KG/M2 | SYSTOLIC BLOOD PRESSURE: 118 MMHG

## 2020-10-28 DIAGNOSIS — E16.2 HYPOGLYCEMIA: ICD-10-CM

## 2020-10-28 DIAGNOSIS — U07.1 LAB TEST POSITIVE FOR DETECTION OF COVID-19 VIRUS: ICD-10-CM

## 2020-10-28 DIAGNOSIS — M25.50 ARTHRALGIA, UNSPECIFIED JOINT: ICD-10-CM

## 2020-10-28 DIAGNOSIS — M25.511 ACUTE PAIN OF RIGHT SHOULDER: Primary | ICD-10-CM

## 2020-10-28 DIAGNOSIS — E03.9 HYPOTHYROIDISM (ACQUIRED): ICD-10-CM

## 2020-10-28 DIAGNOSIS — R49.9 CHANGE IN VOICE: ICD-10-CM

## 2020-10-28 DIAGNOSIS — R23.2 HOT FLASHES: ICD-10-CM

## 2020-10-28 DIAGNOSIS — L65.9 HAIR LOSS: ICD-10-CM

## 2020-10-28 DIAGNOSIS — Z12.31 ENCOUNTER FOR SCREENING MAMMOGRAM FOR MALIGNANT NEOPLASM OF BREAST: ICD-10-CM

## 2020-10-28 PROCEDURE — 99214 OFFICE O/P EST MOD 30 MIN: CPT | Performed by: INTERNAL MEDICINE

## 2020-10-28 NOTE — PROGRESS NOTES
Subjective   Rebeka Harding is a 55 y.o. female here for right shoulder pain and some new acute symptoms.  She has noticed change in voice, hair loss, hot flashes, feels hot all the time, wakes up shaking and feeling like she has to eat.  She used to have hypoglycemia symptoms when she was extremely active in the past and she simply had to eat small meals throughout the day.  She has never had an eye, however.  She does not have history of hypothyroidism.  She denies any dysphagia or feeling like anything is pressing on her neck.  She is also noticed some dark hair growth on her arms, stomach, back.  She had a hysterectomy at 24 due to cervical cancer but her ovaries remain intact.  She estimates that she went through menopause 1 to 2 years ago.  She had hot flashes at the time but she is not having those anymore.  She does have hypothyroidism, was previously at goal and no recent dose change.  Her right shoulder has been bothering her.  She hurt it several years ago and it resolved over time.  Recently, she is having difficulty with range of motion, severe pain with trying to move the arm.  She does not recall any recent injury.  She would like to see Ortho.  Lastly, she is having pain in her hands and knees.  Her Ortho suggested she should get checked out for rheumatoid arthritis as the degree of her arthritis is unusual for someone who is never been overweight and has been active her whole life.  Patient has recently gained a little bit of weight but only about 5 pounds.    I have reviewed the following portions of the patient's history and confirmed they are accurate: current medications, past medical history, past social history and problem list     I have personally completed the patient's review of systems.    Review of Systems:  General: negative  HEENT: Negative  CV: negative  Respiratory: negative  Neuro: negative  Psych: negative  Endo: See HPI  MSK: Shoulder pain, arthralgia, joint swelling  Skin: Hair  "growth    Objective   /74 (BP Location: Right arm, Patient Position: Sitting, Cuff Size: Adult)   Temp 97.1 °F (36.2 °C) (Temporal)   Ht 165.1 cm (65\")   Wt 68.9 kg (152 lb)   BMI 25.29 kg/m²     Physical Exam  Vitals signs reviewed.   Constitutional:       Appearance: Normal appearance. She is well-developed.   HENT:      Head: Normocephalic and atraumatic.   Eyes:      General: Gaze aligned appropriately.      Conjunctiva/sclera: Conjunctivae normal.   Neck:      Thyroid: No thyroid mass, thyromegaly or thyroid tenderness.      Trachea: Trachea normal.   Cardiovascular:      Rate and Rhythm: Normal rate and regular rhythm.      Heart sounds: Normal heart sounds.   Pulmonary:      Effort: Pulmonary effort is normal.      Breath sounds: Normal breath sounds. No wheezing or rales.   Skin:     General: Skin is warm and dry.   Neurological:      Mental Status: She is alert and oriented to person, place, and time.   Psychiatric:         Behavior: Behavior normal.         Thought Content: Thought content normal.         Assessment/Plan   Diagnoses and all orders for this visit:    1. Acute pain of right shoulder (Primary)  -     Ambulatory Referral to Orthopedic Surgery  -Worsening of chronic problem, refer to Ortho    2. Change in voice  -     DHEA-Sulfate; Future  -     Testosterone (Free & Total), LC / MS; Future  -     US Thyroid  -New requiring work-up    3. Hair growth, abnormal  -     DHEA-Sulfate; Future  -     Testosterone (Free & Total), LC / MS; Future      4. Hot flashes  -     CBC (No Diff); Future  -     DHEA-Sulfate; Future  -     Testosterone (Free & Total), LC / MS; Future  -New requiring work-up    5. Lab test positive for detection of COVID-19 virus  -     SARS-CoV-2 Antibodies (Roche); Future    6. Hypothyroidism (acquired)  -     Comprehensive Metabolic Panel; Future  -     TSH; Future  -     T4, Free; Future  -     US Thyroid  -Chronic though with some suggestive symptoms of uncontrolled " thyroid    7. Hypoglycemia  -     Hemoglobin A1c; Future    8. Arthralgia, unspecified joint  -     ELISABETH With / DsDNA, RNP, Sjogrens A / B, Oneill; Future  -     Rheumatoid Factor; Future  -     Cyclic Citrul Peptide Antibody, IgG / IgA; Future  -     C-reactive Protein; Future  -     Sedimentation Rate; Future  -New requiring work-up    9. Encounter for screening mammogram for malignant neoplasm of breast  -     Mammo Screening Digital Tomosynthesis Bilateral With CAD             Joaquina Turner MA    Please note that portions of this note were completed with a voice recognition program. Efforts were made to edit the dictations, but occasionally words are mistranscribed.  Answers for HPI/ROS submitted by the patient on 10/28/2020   What is the primary reason for your visit?: Other  Please describe your symptoms.: I feel like blood sugar is dropping, difficulty sleeping, wake up starving during the night.  Excessive hair growing on body, loosing hair on head. Difficulty swallowing at times.  Seperate problem: Shoulder pain started 2 months ago.  Pain mostly during night.  Now intense pain when moving shoulder and aches constantly. Ortho Dr for knee suggested RA test due to inflammation in several joints.  Have you had these symptoms before?: Yes  How long have you been having these symptoms?: Greater than 2 weeks  Please list any medications you are currently taking for this condition.: Meloxicam for joint pain.

## 2020-11-03 RX ORDER — GABAPENTIN 100 MG/1
100 CAPSULE ORAL 3 TIMES DAILY
Qty: 14 CAPSULE | Refills: 1 | Status: SHIPPED | OUTPATIENT
Start: 2020-11-03 | End: 2020-12-02

## 2020-11-05 ENCOUNTER — OFFICE VISIT (OUTPATIENT)
Dept: ORTHOPEDIC SURGERY | Facility: CLINIC | Age: 55
End: 2020-11-05

## 2020-11-05 VITALS — OXYGEN SATURATION: 99 % | HEART RATE: 83 BPM | WEIGHT: 151.9 LBS | BODY MASS INDEX: 25.31 KG/M2 | HEIGHT: 65 IN

## 2020-11-05 DIAGNOSIS — M19.011 ARTHRITIS OF RIGHT ACROMIOCLAVICULAR JOINT: ICD-10-CM

## 2020-11-05 DIAGNOSIS — IMO0002 DISORDER OF ROTATOR CUFF SYNDROME OF RIGHT SHOULDER AND ALLIED DISORDER: ICD-10-CM

## 2020-11-05 DIAGNOSIS — M75.31 CALCIFIC TENDINITIS OF RIGHT SHOULDER: ICD-10-CM

## 2020-11-05 DIAGNOSIS — M25.511 ACUTE PAIN OF RIGHT SHOULDER: Primary | ICD-10-CM

## 2020-11-05 PROCEDURE — 99204 OFFICE O/P NEW MOD 45 MIN: CPT | Performed by: ORTHOPAEDIC SURGERY

## 2020-11-05 PROCEDURE — 20610 DRAIN/INJ JOINT/BURSA W/O US: CPT | Performed by: ORTHOPAEDIC SURGERY

## 2020-11-05 RX ORDER — LORATADINE 10 MG/1
CAPSULE, LIQUID FILLED ORAL
COMMUNITY

## 2020-11-05 RX ORDER — LIDOCAINE HYDROCHLORIDE 10 MG/ML
3 INJECTION, SOLUTION EPIDURAL; INFILTRATION; INTRACAUDAL; PERINEURAL
Status: COMPLETED | OUTPATIENT
Start: 2020-11-05 | End: 2020-11-05

## 2020-11-05 RX ORDER — CEPHALEXIN 500 MG/1
CAPSULE ORAL
COMMUNITY
Start: 2020-10-28 | End: 2020-12-02

## 2020-11-05 RX ORDER — TRIAMCINOLONE ACETONIDE 40 MG/ML
40 INJECTION, SUSPENSION INTRA-ARTICULAR; INTRAMUSCULAR
Status: COMPLETED | OUTPATIENT
Start: 2020-11-05 | End: 2020-11-05

## 2020-11-05 RX ADMIN — LIDOCAINE HYDROCHLORIDE 3 ML: 10 INJECTION, SOLUTION EPIDURAL; INFILTRATION; INTRACAUDAL; PERINEURAL at 12:19

## 2020-11-05 RX ADMIN — TRIAMCINOLONE ACETONIDE 40 MG: 40 INJECTION, SUSPENSION INTRA-ARTICULAR; INTRAMUSCULAR at 12:19

## 2020-11-05 NOTE — PROGRESS NOTES
[facility] [place], [place] Orthopaedic Surgery Clinic Note        Subjective     Pain of the Right Shoulder      HPI    Rebeka Harding is a 55 y.o. female who presents with new problem of: right shoulder pain.  Onset: atraumatic and gradual in nature. The issue has been ongoing for 2 month(s). Pain is a 8/10 on the pain scale. Pain is described as stabbing and shooting. Associated symptoms include pain. The pain is worse with sleeping and certain movements; ice and pain medication and/or NSAID improve the pain. Previous treatments have included: NSAIDS.    I have reviewed the following portions of the patient's history:History of Present Illness and review of systems.    Patient is here today for evaluation of right shoulder pain.  This has been going on for the last couple months but worse over the past few weeks.  She has difficulty raising her arm overhead.  Denies fever, chills, nausea or vomiting.  She rates her discomfort as 8 out of 10.  The pain is sharp and stabbing in nature.  She has difficulty getting comfortable at night and laying on the shoulder.  She is tried anti-inflammatories without a lot of efficacy.  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      Past Medical History:   Diagnosis Date   • Asthma    • Cancer (CMS/HCC)     cervical   • Diverticulosis    • Hx of radiation therapy 1988    FOR CERVICAL CANCER   • Migraine    • Ovarian cyst    • Thyroid disease    • Urinary tract infection       Past Surgical History:   Procedure Laterality Date   • AUGMENTATION MAMMAPLASTY Bilateral 2012   • BLADDER SURGERY     • BREAST CYST ASPIRATION  1998   • HYSTERECTOMY  1988   • KNEE MENISCAL REPAIR      right knee   • TONSILLECTOMY        Family History   Problem Relation Age of Onset   • Heart attack Mother    • Hyperlipidemia Mother    • Migraines Mother    • Thyroid disease Mother    • Bleeding Disorder Father    • Heart attack Father    •  Hypertension Father    • Hyperlipidemia Father    • Thyroid disease Father      Social History     Socioeconomic History   • Marital status:      Spouse name: Not on file   • Number of children: Not on file   • Years of education: Not on file   • Highest education level: Not on file   Tobacco Use   • Smoking status: Never Smoker   • Smokeless tobacco: Never Used   Substance and Sexual Activity   • Alcohol use: Yes     Comment: 2 to 4 glasses a week   • Drug use: No      Current Outpatient Medications on File Prior to Visit   Medication Sig Dispense Refill   • Albuterol Sulfate (VENTOLIN HFA IN) Inhale.     • buPROPion SR (WELLBUTRIN SR) 150 MG 12 hr tablet TAKE 1 TABLET TWICE A  tablet 4   • diclofenac (VOLTAREN) 1 % gel gel Apply 4 g topically to the appropriate area as directed 4 (Four) Times a Day As Needed (pain). 100 g 3   • gabapentin (NEURONTIN) 100 MG capsule Take 1 capsule by mouth 3 (Three) Times a Day. 14 capsule 1   • levothyroxine (SYNTHROID, LEVOTHROID) 50 MCG tablet TAKE 1 TABLET IN THE MORNING, WAIT 1 HOUR FOR FOOD OR OTHER MEDICATIONS 90 tablet 4   • Loratadine (Claritin) 10 MG capsule Take  by mouth.     • meloxicam (MOBIC) 15 MG tablet TAKE 1 TABLET DAILY AS NEEDED FOR PAIN OR INFLAMMATION 90 tablet 3   • mometasone (ELOCON) 0.1 % lotion Apply  topically to the appropriate area as directed Daily. 90 mL 1   • SUMAtriptan (IMITREX) 100 MG tablet Take 1 tab po prn migraine. May repeat at 2 hr. Max 2 in 24 hr. 36 tablet 1   • valACYclovir (VALTREX) 500 MG tablet Take 1 tablet by mouth Daily. 90 tablet 3   • cephalexin (KEFLEX) 500 MG capsule        No current facility-administered medications on file prior to visit.       Allergies   Allergen Reactions   • Latex Rash   • Sulfa Antibiotics Rash          Review of Systems   Constitutional: Negative.    HENT: Negative.    Eyes: Negative.    Respiratory: Negative.    Cardiovascular: Negative.    Gastrointestinal: Negative.    Endocrine:  "Negative.    Genitourinary: Negative.    Musculoskeletal: Positive for arthralgias.   Skin: Negative.    Allergic/Immunologic: Negative.    Neurological: Negative.    Hematological: Negative.    Psychiatric/Behavioral: Negative.         I reviewed the patient's chief complaint, history of present illness, review of systems, past medical history, surgical history, family history, social history, medications and allergy list.        Objective      Physical Exam  Pulse 83   Ht 165.1 cm (65\")   Wt 68.9 kg (151 lb 14.4 oz)   SpO2 99%   BMI 25.28 kg/m²     Body mass index is 25.28 kg/m².    General  Mental Status - alert  General Appearance - cooperative, well groomed, not in acute distress  Orientation - Oriented X3  Build & Nutrition - well developed and well nourished  Posture - normal posture  Gait - normal gait     Integumentary  Global Assessment  Examination of related systems reveals - no lymphadenopathy  Ears:  No abnormality  Nose:  No mucous drainage  General Characteristics  Overall examination of the patient's skin reveals - no rashes, no evidence of scars, no suspicious lesions and no bruises.  Color - normal coloration of skin.  Vascular: Brisk capillary refill in all extremities    Ortho Exam  Musculoskeletal   Upper Extremity   Right Shoulder     Inspection and Palpation:     Medial border scapular tenderness-none    AC Joint Tenderness -moderate    Sensation is normal    Examination reveals no ecchymosis.        Strength and Tone:    Supraspinatus -4-5 with pain    External Jthldmqj-1-6 with pain    Infraspinatus - 5/5    Subscapularis -4-5 with pain    Deltoid - 5/5     Range of Motion      RightShoulder:    Internal Rotation: ROM -hip pocket    External Rotation: AROM -50 degrees    Elevation through flexion: AROM -60 degrees (150)    Abduction: Less than 90 degrees       Impingement   Right shoulder    Page-Nickolas impingement test positive    Neer impingement test positive     Functional " Testing   Right shoulder    AC crossover adduction test positive    Positive drop arm sign         Imaging/Studies  Imaging Results (Last 24 Hours)     ** No results found for the last 24 hours. **            Assessment    Assessment:  1. Acute pain of right shoulder    2. Calcific tendinitis of right shoulder    3. Arthritis of right acromioclavicular joint    4. Disorder of rotator cuff syndrome of right shoulder and allied disorder        Plan:  Continue over-the-counter medication as needed for discomfort  Acute on chronic right shoulder pain in the face of radiographic abnormality on the scapular Y view consistent with calcific tendinitis.  Patient also has an exam that is worrisome for a large rotator cuff tear.  She has no history of any recent trauma however.  Plan will be for diagnostic and therapeutic injection into the subacromial space on the right side.  I will see her back in about 4 weeks and if she is no better, an MRI will be requested.    Procedure Note:    I discussed with the patient the potential benefits of performing a therapeutic injections as well as potential risks including but not limited to infection, swelling, pain, bleeding, bruising, nerve/vessel damage, skin color changes, transient elevation in blood glucose levels, and fat atrophy. After informed consent and after the areas were prepped with chlorhexadine soap, ethyl chloride was used to numb the skin. Via the posterolateral approach, 3mL of 1% lidocaine followed by 40mg of Kenalog were each injected into the subacromial space of the right shoulder. The patient tolerated the procedure well. There were no complications. A sterile dressing was placed over the injection sites.          Seun Galvan MD  11/12/20  08:34 MIREILLE Reeves disclaimer:  Much of this encounter note is an electronic transcription/translation of spoken language to printed text. The electronic translation of spoken language may permit erroneous, or at  times, nonsensical words or phrases to be inadvertently transcribed; Although I have reviewed the note for such errors, some may still exist.

## 2020-11-11 ENCOUNTER — HOSPITAL ENCOUNTER (OUTPATIENT)
Dept: MAMMOGRAPHY | Facility: HOSPITAL | Age: 55
Discharge: HOME | End: 2020-11-11

## 2020-11-11 ENCOUNTER — HOSPITAL ENCOUNTER (OUTPATIENT)
Dept: ULTRASOUND IMAGING | Facility: HOSPITAL | Age: 55
Discharge: HOME | End: 2020-11-11

## 2020-11-11 PROCEDURE — 77063 BREAST TOMOSYNTHESIS BI: CPT | Performed by: RADIOLOGY

## 2020-11-11 PROCEDURE — 77067 SCR MAMMO BI INCL CAD: CPT

## 2020-11-11 PROCEDURE — 77067 SCR MAMMO BI INCL CAD: CPT | Performed by: RADIOLOGY

## 2020-11-11 PROCEDURE — 76536 US EXAM OF HEAD AND NECK: CPT

## 2020-11-11 PROCEDURE — 77063 BREAST TOMOSYNTHESIS BI: CPT

## 2020-11-20 DIAGNOSIS — L65.9 HAIR LOSS: ICD-10-CM

## 2020-11-20 DIAGNOSIS — R79.89 ELEVATED TESTOSTERONE LEVEL: ICD-10-CM

## 2020-11-20 DIAGNOSIS — R76.8 ANA POSITIVE: ICD-10-CM

## 2020-11-20 DIAGNOSIS — M25.50 ARTHRALGIA, UNSPECIFIED JOINT: ICD-10-CM

## 2020-11-20 DIAGNOSIS — R49.9 CHANGE IN VOICE: Primary | ICD-10-CM

## 2020-12-01 ENCOUNTER — OFFICE VISIT (OUTPATIENT)
Dept: ORTHOPEDIC SURGERY | Facility: CLINIC | Age: 55
End: 2020-12-01

## 2020-12-01 VITALS — HEART RATE: 81 BPM | WEIGHT: 151.9 LBS | BODY MASS INDEX: 25.31 KG/M2 | OXYGEN SATURATION: 97 % | HEIGHT: 65 IN

## 2020-12-01 DIAGNOSIS — M75.31 CALCIFIC TENDINITIS OF RIGHT SHOULDER: ICD-10-CM

## 2020-12-01 DIAGNOSIS — IMO0002 DISORDER OF ROTATOR CUFF SYNDROME OF RIGHT SHOULDER AND ALLIED DISORDER: ICD-10-CM

## 2020-12-01 DIAGNOSIS — M25.511 ACUTE PAIN OF RIGHT SHOULDER: Primary | ICD-10-CM

## 2020-12-01 DIAGNOSIS — M19.011 ARTHRITIS OF RIGHT ACROMIOCLAVICULAR JOINT: ICD-10-CM

## 2020-12-01 PROCEDURE — 99213 OFFICE O/P EST LOW 20 MIN: CPT | Performed by: ORTHOPAEDIC SURGERY

## 2020-12-01 NOTE — PROGRESS NOTES
"    [facility] [place] Orthopaedic Surgery Clinic Note        Subjective     CC: Follow-up (3 week f/u; Acute pain of right shoulder (right subacromial cortisone injection 11/5/20))      MISSY Harding is a 55 y.o. female.  Patient returns to the office today for follow-up of her right shoulder.  At her last visit on 11/5/2020, she was injected subacromially for calcific tendinitis and rotator cuff syndrome.  She says it took quite some time for the shot to help and only in the last 4 to 5 days has it helped her discomfort.  She still has difficulty lifting any type of weight overhead.  Please see the note from 11/5/2020 for complete history.    Overall, patient's symptoms are slightly better only.    ROS:    Constiutional:Pt denies fever, chills, nausea, or vomiting.  MSK:as above        Objective      Past Medical History  Past Medical History:   Diagnosis Date   • Asthma    • Cancer (CMS/HCC)     cervical   • Diverticulosis    • Hx of radiation therapy 1988    FOR CERVICAL CANCER   • Migraine    • Ovarian cyst    • Thyroid disease    • Urinary tract infection          Physical Exam  Pulse 81   Ht 165.1 cm (65\")   Wt 68.9 kg (151 lb 14.4 oz)   SpO2 97%   BMI 25.28 kg/m²     Body mass index is 25.28 kg/m².    Patient is well nourished and well developed.        Ortho Exam  Musculoskeletal   Upper Extremity   Right Shoulder     Inspection and Palpation:     Medial border scapular tenderness-none    AC Joint Tenderness -minimal    Sensation is normal    Examination reveals no ecchymosis.        Strength and Tone:    Supraspinatus -4-5 with pain    External Rotators-5/5 with pain    Infraspinatus - 5/5    Subscapularis - 5/5    Deltoid - 5/5     Range of Motion      RightShoulder:    Internal Rotation: ROM - L4    External Rotation: AROM - 60 degrees    Elevation through flexion: AROM - 140 degrees        Impingement   Right shoulder    Page-Nickolas impingement test positive    Neer impingement test " negative     Functional Testing   Right shoulder    AC crossover adduction test negative    Speeds test negative    Uppercut test negative    O'Briens test negative    Drop arm sign negative    Apprehension relocation negative      Imaging/Labs/EMG Reviewed:  Imaging Results (Last 24 Hours)     ** No results found for the last 24 hours. **            Assessment    Assessment:  1. Acute pain of right shoulder    2. Calcific tendinitis of right shoulder    3. Arthritis of right acromioclavicular joint    4. Disorder of rotator cuff syndrome of right shoulder and allied disorder        Plan:  Recommend over the counter anti-inflammatories for pain and/or swelling  Acute right shoulder pain in the face of calcific tendinitis, x-ray evidence of AC joint arthritis, and rotator cuff syndrome--patient has failed subacromial injection.  Plan will be for an MRI.  I have told patient that I would be more shocked if she does not have a cuff tear and if she did.  I will see her back to review the study.      Seun Galvan MD  12/01/20  13:48 MIREILLE Reeves disclaimer:  Much of this encounter note is an electronic transcription/translation of spoken language to printed text. The electronic translation of spoken language may permit erroneous, or at times, nonsensical words or phrases to be inadvertently transcribed; Although I have reviewed the note for such errors, some may still exist.

## 2020-12-02 ENCOUNTER — OFFICE VISIT (OUTPATIENT)
Dept: INTERNAL MEDICINE | Facility: CLINIC | Age: 55
End: 2020-12-02

## 2020-12-02 VITALS
TEMPERATURE: 97.1 F | SYSTOLIC BLOOD PRESSURE: 122 MMHG | BODY MASS INDEX: 24.66 KG/M2 | WEIGHT: 148 LBS | HEIGHT: 65 IN | DIASTOLIC BLOOD PRESSURE: 76 MMHG | RESPIRATION RATE: 16 BRPM | HEART RATE: 73 BPM | OXYGEN SATURATION: 98 %

## 2020-12-02 DIAGNOSIS — N30.01 ACUTE CYSTITIS WITH HEMATURIA: ICD-10-CM

## 2020-12-02 DIAGNOSIS — B37.9 ANTIBIOTIC-INDUCED YEAST INFECTION: ICD-10-CM

## 2020-12-02 DIAGNOSIS — R30.0 DYSURIA: ICD-10-CM

## 2020-12-02 DIAGNOSIS — B02.8 HERPES ZOSTER WITH COMPLICATION: Primary | ICD-10-CM

## 2020-12-02 DIAGNOSIS — T36.95XA ANTIBIOTIC-INDUCED YEAST INFECTION: ICD-10-CM

## 2020-12-02 LAB
BILIRUB BLD-MCNC: NEGATIVE MG/DL
CLARITY, POC: ABNORMAL
COLOR UR: YELLOW
GLUCOSE UR STRIP-MCNC: NEGATIVE MG/DL
KETONES UR QL: NEGATIVE
LEUKOCYTE EST, POC: ABNORMAL
NITRITE UR-MCNC: NEGATIVE MG/ML
PH UR: 6.5 [PH] (ref 5–8)
PROT UR STRIP-MCNC: NEGATIVE MG/DL
RBC # UR STRIP: ABNORMAL /UL
SP GR UR: 1.01 (ref 1–1.03)
UROBILINOGEN UR QL: NORMAL

## 2020-12-02 PROCEDURE — 81003 URINALYSIS AUTO W/O SCOPE: CPT | Performed by: NURSE PRACTITIONER

## 2020-12-02 PROCEDURE — 87077 CULTURE AEROBIC IDENTIFY: CPT | Performed by: NURSE PRACTITIONER

## 2020-12-02 PROCEDURE — 87186 SC STD MICRODIL/AGAR DIL: CPT | Performed by: NURSE PRACTITIONER

## 2020-12-02 PROCEDURE — 99214 OFFICE O/P EST MOD 30 MIN: CPT | Performed by: NURSE PRACTITIONER

## 2020-12-02 PROCEDURE — 87086 URINE CULTURE/COLONY COUNT: CPT | Performed by: NURSE PRACTITIONER

## 2020-12-02 RX ORDER — ONDANSETRON 8 MG/1
TABLET, ORALLY DISINTEGRATING ORAL
Qty: 30 TABLET | Refills: 1 | Status: SHIPPED | OUTPATIENT
Start: 2020-12-02

## 2020-12-02 RX ORDER — GABAPENTIN 300 MG/1
300 CAPSULE ORAL
Qty: 90 CAPSULE | Refills: 1 | Status: SHIPPED | OUTPATIENT
Start: 2020-12-02 | End: 2020-12-02

## 2020-12-02 RX ORDER — FLUCONAZOLE 150 MG/1
TABLET ORAL
Qty: 2 TABLET | Refills: 1 | Status: SHIPPED | OUTPATIENT
Start: 2020-12-02 | End: 2020-12-30

## 2020-12-02 RX ORDER — VALACYCLOVIR HYDROCHLORIDE 1 G/1
1000 TABLET, FILM COATED ORAL 3 TIMES DAILY
Qty: 21 TABLET | Refills: 1 | Status: SHIPPED | OUTPATIENT
Start: 2020-12-02 | End: 2020-12-09

## 2020-12-02 RX ORDER — CEPHALEXIN 500 MG/1
500 CAPSULE ORAL 3 TIMES DAILY
Qty: 21 CAPSULE | Refills: 0 | Status: SHIPPED | OUTPATIENT
Start: 2020-12-02 | End: 2020-12-09

## 2020-12-02 RX ORDER — GABAPENTIN 300 MG/1
300 CAPSULE ORAL 3 TIMES DAILY PRN
Qty: 90 CAPSULE | Refills: 1
Start: 2020-12-02 | End: 2021-05-10

## 2020-12-02 NOTE — PROGRESS NOTES
Subjective   Chief Complaint   Patient presents with   • Difficulty Urinating   • Herpes Zoster      Rebeka Harding is a 55 y.o. female here today for dysuria and rash.  3 days ago she developed a rash on the right side of her stomach that is a blistering streaking rash.  Rash itches and burns and she believes this is shingles.  She has never had an outbreak of shingles before.  She takes valacyclovir as needed for HSV.  She started 500 mg daily 3 days ago but this has not helped.  She has been prescribed gabapentin 100mg tid for shoulder pain but this has not been helping with rash pain. She has dysuria for one week with urinary urgency and frequency. Having pelvic pressure. No fevers. Drinking adequate water.  She is going on vacation and is needing antibiotic that would not cause photosensitivity.  She gets vaginal yeast infection with antibiotics and will need Diflucan called in with antibiotics today. She is going out of the country for vacation and asking for zofran in case of nausea or illness.     I have reviewed the following portions of the patient's history and confirmed they are accurate: allergies, current medications, past family history, past medical history, past social history, past surgical history and problem list    I have personally completed the patient's review of systems.    Review of Systems   Constitutional: Negative for activity change, appetite change, chills, diaphoresis, fatigue, fever, unexpected weight gain and unexpected weight loss.   HENT: Negative for ear discharge, ear pain, mouth sores, nosebleeds, sinus pressure, sneezing and sore throat.    Eyes: Negative for pain, discharge and itching.   Respiratory: Negative for cough, chest tightness, shortness of breath and wheezing.    Cardiovascular: Negative for chest pain, palpitations and leg swelling.   Gastrointestinal: Negative for abdominal pain, constipation, diarrhea, nausea and vomiting.   Endocrine: Negative for heat  intolerance, polydipsia and polyphagia.   Genitourinary: Positive for dysuria, frequency, pelvic pressure and urgency. Negative for difficulty urinating, flank pain and hematuria.   Musculoskeletal: Positive for myalgias. Negative for arthralgias, back pain, gait problem, joint swelling, neck pain and neck stiffness.   Skin: Positive for color change and rash. Negative for pallor.   Allergic/Immunologic: Negative for immunocompromised state.   Neurological: Negative for seizures, speech difficulty, weakness and numbness.   Hematological: Negative for adenopathy.   Psychiatric/Behavioral: Negative for agitation, decreased concentration, sleep disturbance, suicidal ideas and depressed mood. The patient is not nervous/anxious.        Current Outpatient Medications on File Prior to Visit   Medication Sig   • Albuterol Sulfate (VENTOLIN HFA IN) Inhale.   • buPROPion SR (WELLBUTRIN SR) 150 MG 12 hr tablet TAKE 1 TABLET TWICE A DAY   • diclofenac (VOLTAREN) 1 % gel gel Apply 4 g topically to the appropriate area as directed 4 (Four) Times a Day As Needed (pain).   • levothyroxine (SYNTHROID, LEVOTHROID) 50 MCG tablet TAKE 1 TABLET IN THE MORNING, WAIT 1 HOUR FOR FOOD OR OTHER MEDICATIONS   • Loratadine (Claritin) 10 MG capsule Take  by mouth.   • meloxicam (MOBIC) 15 MG tablet TAKE 1 TABLET DAILY AS NEEDED FOR PAIN OR INFLAMMATION   • mometasone (ELOCON) 0.1 % lotion Apply  topically to the appropriate area as directed Daily.   • SUMAtriptan (IMITREX) 100 MG tablet Take 1 tab po prn migraine. May repeat at 2 hr. Max 2 in 24 hr.   • [DISCONTINUED] gabapentin (NEURONTIN) 100 MG capsule Take 1 capsule by mouth 3 (Three) Times a Day.   • [DISCONTINUED] valACYclovir (VALTREX) 500 MG tablet Take 1 tablet by mouth Daily.   • [DISCONTINUED] cephalexin (KEFLEX) 500 MG capsule      No current facility-administered medications on file prior to visit.        Objective   Vitals:    12/02/20 0840   BP: 122/76   Pulse: 73   Resp: 16  "  Temp: 97.1 °F (36.2 °C)   TempSrc: Temporal   SpO2: 98%   Weight: 67.1 kg (148 lb)   Height: 165.1 cm (65\")     Body mass index is 24.63 kg/m².    Physical Exam  Vitals signs reviewed.   Constitutional:       Appearance: Normal appearance. She is well-developed.   HENT:      Head: Normocephalic and atraumatic.      Nose: Nose normal.   Eyes:      General: Lids are normal.      Conjunctiva/sclera: Conjunctivae normal.      Pupils: Pupils are equal, round, and reactive to light.   Neck:      Thyroid: No thyromegaly.      Trachea: Trachea normal.   Pulmonary:      Effort: Pulmonary effort is normal. No respiratory distress.   Skin:     General: Skin is warm and dry.      Findings: Erythema and rash present. Rash is vesicular.      Comments: Red blistering streaking rash on right of abdomen in 3 spots.   Neurological:      Mental Status: She is alert and oriented to person, place, and time.      GCS: GCS eye subscore is 4. GCS verbal subscore is 5. GCS motor subscore is 6.   Psychiatric:         Attention and Perception: Attention normal.         Mood and Affect: Mood normal.         Speech: Speech normal.         Behavior: Behavior normal. Behavior is cooperative.         Assessment/Plan   Rebeka was seen today for difficulty urinating and herpes zoster.  Diagnoses and all orders for this visit:  Herpes zoster with complication (Primary)  New onset issue requiring medication management.  Will eat a well-balanced diet, increase water intake, and get adequate rest.  Can take oatmeal baths as needed for itching and discomfort.  Suggested she get shingles vaccine after rash has resolved for at least 2 months.  -     valACYclovir (VALTREX) 1000 MG tablet  -     gabapentin (NEURONTIN) 300 MG capsule    Acute cystitis with hematuria  New onset issue requiring medication management. Will eat well balanced diet and increase water intake. Discussed hygiene such as wiping front to back. Will refrain from taking baths and take " showers only. Will only use free and clear body and vaginal washes. Discussed wearing dye free loose undergarments. Suggested starting over the counter probiotic daily.   -     POCT urinalysis dipstick, automated  -     Urine Culture - Urine, Urine, Clean Catch  -     cephalexin (KEFLEX) 500 MG capsule    Antibiotic-induced yeast infection  New onset issue requiring medication management. Will eat well balanced diet and increase water intake. Will refrain from taking baths and take showers only. Will only use free and clear body and vaginal washes. Discussed wearing dye free loose undergarments. Suggested over the counter probiotic daily.   -     cephalexin (KEFLEX) 500 MG capsule  -     fluconazole (DIFLUCAN) 150 MG tablet    Dysuria  -     POCT urinalysis dipstick, automated  -     Urine Culture - Urine, Urine, Clean Catch  Other orders  -     ondansetron ODT (ZOFRAN-ODT) 8 MG disintegrating tablet         Current Outpatient Medications:   •  Albuterol Sulfate (VENTOLIN HFA IN), Inhale., Disp: , Rfl:   •  buPROPion SR (WELLBUTRIN SR) 150 MG 12 hr tablet, TAKE 1 TABLET TWICE A DAY, Disp: 180 tablet, Rfl: 4  •  diclofenac (VOLTAREN) 1 % gel gel, Apply 4 g topically to the appropriate area as directed 4 (Four) Times a Day As Needed (pain)., Disp: 100 g, Rfl: 3  •  levothyroxine (SYNTHROID, LEVOTHROID) 50 MCG tablet, TAKE 1 TABLET IN THE MORNING, WAIT 1 HOUR FOR FOOD OR OTHER MEDICATIONS, Disp: 90 tablet, Rfl: 4  •  Loratadine (Claritin) 10 MG capsule, Take  by mouth., Disp: , Rfl:   •  meloxicam (MOBIC) 15 MG tablet, TAKE 1 TABLET DAILY AS NEEDED FOR PAIN OR INFLAMMATION, Disp: 90 tablet, Rfl: 3  •  mometasone (ELOCON) 0.1 % lotion, Apply  topically to the appropriate area as directed Daily., Disp: 90 mL, Rfl: 1  •  SUMAtriptan (IMITREX) 100 MG tablet, Take 1 tab po prn migraine. May repeat at 2 hr. Max 2 in 24 hr., Disp: 36 tablet, Rfl: 1  •  cephalexin (KEFLEX) 500 MG capsule, Take 1 capsule by mouth 3 (Three)  Times a Day for 7 days., Disp: 21 capsule, Rfl: 0  •  fluconazole (DIFLUCAN) 150 MG tablet, Take one tablet by mouth and repeat in 3 days., Disp: 2 tablet, Rfl: 1  •  gabapentin (NEURONTIN) 300 MG capsule, Take 1 capsule by mouth 3 (Three) Times a Day As Needed (shingles pain)., Disp: 90 capsule, Rfl: 1  •  ondansetron ODT (ZOFRAN-ODT) 8 MG disintegrating tablet, Take 1/2 to 1 tablet by mouth (dissolve under tongue or swallow) every 8 hours as needed for nausea., Disp: 30 tablet, Rfl: 1  •  valACYclovir (VALTREX) 1000 MG tablet, Take 1 tablet by mouth 3 (Three) Times a Day for 7 days., Disp: 21 tablet, Rfl: 1       Plan of care reviewed with the patient at the conclusion of today's visit.  Education was provided regarding diagnosis, management, and any prescribed or recommended OTC medications.  Patient verbalized understanding of and agreement with management plan.     Return if symptoms worsen or fail to improve.      DERRICK Leary    Please note that portions of this note were completed with a voice recognition program. Efforts were made to edit the dictations, but occasionally words are mistranscribed.

## 2020-12-04 LAB — BACTERIA SPEC AEROBE CULT: ABNORMAL

## 2020-12-04 NOTE — PROGRESS NOTES
My chart message:    Discussed your urine culture back and the antibiotic you are taking is good to get rid of the infection.  Let me know if you don't start feeling better.

## 2020-12-30 ENCOUNTER — OFFICE VISIT (OUTPATIENT)
Dept: INTERNAL MEDICINE | Facility: CLINIC | Age: 55
End: 2020-12-30

## 2020-12-30 ENCOUNTER — LAB (OUTPATIENT)
Dept: LAB | Facility: HOSPITAL | Age: 55
End: 2020-12-30

## 2020-12-30 VITALS
HEART RATE: 69 BPM | TEMPERATURE: 97.6 F | RESPIRATION RATE: 16 BRPM | DIASTOLIC BLOOD PRESSURE: 76 MMHG | OXYGEN SATURATION: 98 % | BODY MASS INDEX: 23.99 KG/M2 | WEIGHT: 144 LBS | SYSTOLIC BLOOD PRESSURE: 118 MMHG | HEIGHT: 65 IN

## 2020-12-30 DIAGNOSIS — Z12.11 COLON CANCER SCREENING: ICD-10-CM

## 2020-12-30 DIAGNOSIS — N30.00 ACUTE CYSTITIS WITHOUT HEMATURIA: ICD-10-CM

## 2020-12-30 DIAGNOSIS — Z01.818 PREOP EXAMINATION: Primary | ICD-10-CM

## 2020-12-30 LAB
ALBUMIN SERPL-MCNC: 4.9 G/DL (ref 3.5–5.2)
ALBUMIN/GLOB SERPL: 2.2 G/DL
ALP SERPL-CCNC: 49 U/L (ref 39–117)
ALT SERPL W P-5'-P-CCNC: 33 U/L (ref 1–33)
ANION GAP SERPL CALCULATED.3IONS-SCNC: 12 MMOL/L (ref 5–15)
APTT PPP: 28.9 SECONDS (ref 24–37)
AST SERPL-CCNC: 24 U/L (ref 1–32)
BILIRUB BLD-MCNC: NEGATIVE MG/DL
BILIRUB SERPL-MCNC: 0.3 MG/DL (ref 0–1.2)
BUN SERPL-MCNC: 20 MG/DL (ref 6–20)
BUN/CREAT SERPL: 22.7 (ref 7–25)
CALCIUM SPEC-SCNC: 9.9 MG/DL (ref 8.6–10.5)
CHLORIDE SERPL-SCNC: 100 MMOL/L (ref 98–107)
CLARITY, POC: ABNORMAL
CO2 SERPL-SCNC: 28 MMOL/L (ref 22–29)
COLOR UR: YELLOW
CREAT SERPL-MCNC: 0.88 MG/DL (ref 0.57–1)
DEPRECATED RDW RBC AUTO: 43.8 FL (ref 37–54)
ERYTHROCYTE [DISTWIDTH] IN BLOOD BY AUTOMATED COUNT: 13.9 % (ref 12.3–15.4)
GFR SERPL CREATININE-BSD FRML MDRD: 67 ML/MIN/1.73
GLOBULIN UR ELPH-MCNC: 2.2 GM/DL
GLUCOSE SERPL-MCNC: 59 MG/DL (ref 65–99)
GLUCOSE UR STRIP-MCNC: NEGATIVE MG/DL
HCT VFR BLD AUTO: 47.7 % (ref 34–46.6)
HGB BLD-MCNC: 15.5 G/DL (ref 12–15.9)
INR PPP: 0.96 (ref 0.85–1.16)
KETONES UR QL: NEGATIVE
LEUKOCYTE EST, POC: ABNORMAL
MCH RBC QN AUTO: 28.2 PG (ref 26.6–33)
MCHC RBC AUTO-ENTMCNC: 32.5 G/DL (ref 31.5–35.7)
MCV RBC AUTO: 86.7 FL (ref 79–97)
NITRITE UR-MCNC: NEGATIVE MG/ML
PH UR: 6 [PH] (ref 5–8)
PLATELET # BLD AUTO: 261 10*3/MM3 (ref 140–450)
PMV BLD AUTO: 11.2 FL (ref 6–12)
POTASSIUM SERPL-SCNC: 4.9 MMOL/L (ref 3.5–5.2)
PROT SERPL-MCNC: 7.1 G/DL (ref 6–8.5)
PROT UR STRIP-MCNC: NEGATIVE MG/DL
PROTHROMBIN TIME: 12.5 SECONDS (ref 11.5–14)
RBC # BLD AUTO: 5.5 10*6/MM3 (ref 3.77–5.28)
RBC # UR STRIP: NEGATIVE /UL
SODIUM SERPL-SCNC: 140 MMOL/L (ref 136–145)
SP GR UR: 1.02 (ref 1–1.03)
T3FREE SERPL-MCNC: 3.47 PG/ML (ref 2–4.4)
T4 FREE SERPL-MCNC: 1.33 NG/DL (ref 0.93–1.7)
TSH SERPL DL<=0.05 MIU/L-ACNC: 0.65 UIU/ML (ref 0.27–4.2)
UROBILINOGEN UR QL: NORMAL
WBC # BLD AUTO: 8.76 10*3/MM3 (ref 3.4–10.8)

## 2020-12-30 PROCEDURE — 99244 OFF/OP CNSLTJ NEW/EST MOD 40: CPT | Performed by: INTERNAL MEDICINE

## 2020-12-30 PROCEDURE — 93000 ELECTROCARDIOGRAM COMPLETE: CPT | Performed by: INTERNAL MEDICINE

## 2020-12-30 PROCEDURE — 84481 FREE ASSAY (FT-3): CPT | Performed by: INTERNAL MEDICINE

## 2020-12-30 PROCEDURE — 85730 THROMBOPLASTIN TIME PARTIAL: CPT | Performed by: INTERNAL MEDICINE

## 2020-12-30 PROCEDURE — 84439 ASSAY OF FREE THYROXINE: CPT | Performed by: INTERNAL MEDICINE

## 2020-12-30 PROCEDURE — 81003 URINALYSIS AUTO W/O SCOPE: CPT | Performed by: INTERNAL MEDICINE

## 2020-12-30 PROCEDURE — 85610 PROTHROMBIN TIME: CPT | Performed by: INTERNAL MEDICINE

## 2020-12-30 PROCEDURE — 80053 COMPREHEN METABOLIC PANEL: CPT | Performed by: INTERNAL MEDICINE

## 2020-12-30 PROCEDURE — 84443 ASSAY THYROID STIM HORMONE: CPT | Performed by: INTERNAL MEDICINE

## 2020-12-30 PROCEDURE — 85027 COMPLETE CBC AUTOMATED: CPT | Performed by: INTERNAL MEDICINE

## 2020-12-30 RX ORDER — NITROFURANTOIN 25; 75 MG/1; MG/1
100 CAPSULE ORAL 2 TIMES DAILY
Qty: 6 CAPSULE | Refills: 0 | Status: SHIPPED | OUTPATIENT
Start: 2020-12-30 | End: 2021-01-02

## 2020-12-30 NOTE — PROGRESS NOTES
Subjective   Rebeka Harding is a 55 y.o. female here for preop exam for facelift  On 1/14/2020 with Dr. Macias.  She has had several surgeries in the past under general anesthesia never had any issue with anesthesia.  She has no cardiac or pulmonary history.  Her only allergies are latex and sulfa.  She is not and has never been a smoker, no illicit drug use, 2 to 4 glasses of wine weekly.    Review of Systems   Constitutional: Negative.    HENT: Negative.    Respiratory: Negative.    Cardiovascular: Negative.    Gastrointestinal: Negative.    Genitourinary: Negative.    Musculoskeletal: Negative.    Allergic/Immunologic: Negative.    Hematological: Negative.           Current Outpatient Medications:   •  Albuterol Sulfate (VENTOLIN HFA IN), Inhale., Disp: , Rfl:   •  buPROPion SR (WELLBUTRIN SR) 150 MG 12 hr tablet, TAKE 1 TABLET TWICE A DAY, Disp: 180 tablet, Rfl: 4  •  diclofenac (VOLTAREN) 1 % gel gel, Apply 4 g topically to the appropriate area as directed 4 (Four) Times a Day As Needed (pain)., Disp: 100 g, Rfl: 3  •  fluconazole (DIFLUCAN) 150 MG tablet, Take one tablet by mouth and repeat in 3 days., Disp: 2 tablet, Rfl: 1  •  gabapentin (NEURONTIN) 300 MG capsule, Take 1 capsule by mouth 3 (Three) Times a Day As Needed (shingles pain)., Disp: 90 capsule, Rfl: 1  •  levothyroxine (SYNTHROID, LEVOTHROID) 50 MCG tablet, TAKE 1 TABLET IN THE MORNING, WAIT 1 HOUR FOR FOOD OR OTHER MEDICATIONS, Disp: 90 tablet, Rfl: 4  •  Loratadine (Claritin) 10 MG capsule, Take  by mouth., Disp: , Rfl:   •  meloxicam (MOBIC) 15 MG tablet, TAKE 1 TABLET DAILY AS NEEDED FOR PAIN OR INFLAMMATION, Disp: 90 tablet, Rfl: 3  •  mometasone (ELOCON) 0.1 % lotion, Apply  topically to the appropriate area as directed Daily., Disp: 90 mL, Rfl: 1  •  ondansetron ODT (ZOFRAN-ODT) 8 MG disintegrating tablet, Take 1/2 to 1 tablet by mouth (dissolve under tongue or swallow) every 8 hours as needed for nausea., Disp: 30 tablet, Rfl: 1  •   SUMAtriptan (IMITREX) 100 MG tablet, Take 1 tab po prn migraine. May repeat at 2 hr. Max 2 in 24 hr., Disp: 36 tablet, Rfl: 1      Past Medical History:   Diagnosis Date   • Asthma    • Cancer (CMS/HCC)     cervical   • Diverticulosis    • Hx of radiation therapy 1988    FOR CERVICAL CANCER   • Migraine    • Ovarian cyst    • Thyroid disease    • Urinary tract infection      Family History   Problem Relation Age of Onset   • Heart attack Mother    • Hyperlipidemia Mother    • Migraines Mother    • Thyroid disease Mother    • Bleeding Disorder Father    • Heart attack Father    • Hypertension Father    • Hyperlipidemia Father    • Thyroid disease Father      Past Surgical History:   Procedure Laterality Date   • AUGMENTATION MAMMAPLASTY Bilateral 2012   • BLADDER SURGERY     • BREAST CYST ASPIRATION  1998   • HYSTERECTOMY  1988   • KNEE MENISCAL REPAIR      right knee   • TONSILLECTOMY       Social History     Socioeconomic History   • Marital status:      Spouse name: Not on file   • Number of children: Not on file   • Years of education: Not on file   • Highest education level: Not on file   Tobacco Use   • Smoking status: Never Smoker   • Smokeless tobacco: Never Used   Substance and Sexual Activity   • Alcohol use: Yes     Comment: 2 to 4 glasses a week   • Drug use: No         Current Outpatient Medications:   •  Albuterol Sulfate (VENTOLIN HFA IN), Inhale., Disp: , Rfl:   •  buPROPion SR (WELLBUTRIN SR) 150 MG 12 hr tablet, TAKE 1 TABLET TWICE A DAY, Disp: 180 tablet, Rfl: 4  •  diclofenac (VOLTAREN) 1 % gel gel, Apply 4 g topically to the appropriate area as directed 4 (Four) Times a Day As Needed (pain)., Disp: 100 g, Rfl: 3  •  fluconazole (DIFLUCAN) 150 MG tablet, Take one tablet by mouth and repeat in 3 days., Disp: 2 tablet, Rfl: 1  •  gabapentin (NEURONTIN) 300 MG capsule, Take 1 capsule by mouth 3 (Three) Times a Day As Needed (shingles pain)., Disp: 90 capsule, Rfl: 1  •  levothyroxine  "(SYNTHROID, LEVOTHROID) 50 MCG tablet, TAKE 1 TABLET IN THE MORNING, WAIT 1 HOUR FOR FOOD OR OTHER MEDICATIONS, Disp: 90 tablet, Rfl: 4  •  Loratadine (Claritin) 10 MG capsule, Take  by mouth., Disp: , Rfl:   •  meloxicam (MOBIC) 15 MG tablet, TAKE 1 TABLET DAILY AS NEEDED FOR PAIN OR INFLAMMATION, Disp: 90 tablet, Rfl: 3  •  mometasone (ELOCON) 0.1 % lotion, Apply  topically to the appropriate area as directed Daily., Disp: 90 mL, Rfl: 1  •  ondansetron ODT (ZOFRAN-ODT) 8 MG disintegrating tablet, Take 1/2 to 1 tablet by mouth (dissolve under tongue or swallow) every 8 hours as needed for nausea., Disp: 30 tablet, Rfl: 1  •  SUMAtriptan (IMITREX) 100 MG tablet, Take 1 tab po prn migraine. May repeat at 2 hr. Max 2 in 24 hr., Disp: 36 tablet, Rfl: 1    Objective   /76   Pulse 69   Temp 97.6 °F (36.4 °C) (Temporal)   Resp 16   Ht 165.1 cm (65\")   Wt 65.3 kg (144 lb)   SpO2 98%   Breastfeeding No   BMI 23.96 kg/m²      Physical Exam  Vitals signs reviewed.   Constitutional:       Appearance: She is well-developed.   Cardiovascular:      Rate and Rhythm: Normal rate and regular rhythm.      Heart sounds: Normal heart sounds.   Pulmonary:      Effort: Pulmonary effort is normal.      Breath sounds: Normal breath sounds. No wheezing or rales.   Skin:     General: Skin is warm and dry.   Neurological:      Mental Status: She is alert and oriented to person, place, and time.   Psychiatric:         Behavior: Behavior normal.         Thought Content: Thought content normal.         ECG 12 Lead    Date/Time: 12/30/2020 1:46 PM  Performed by: Melissa Sheehan MD  Authorized by: Melissa Sheehan MD   Comparison: not compared with previous ECG   Previous ECG: no previous ECG available  Rhythm: sinus rhythm  Conduction: conduction normal  ST Segments: ST segments normal  T Waves: T waves normal  Other: no other findings    Clinical impression: normal ECG              Assessment/Plan   Diagnoses " and all orders for this visit:    1. Preop examination (Primary)  -     CBC (No Diff)  -     Comprehensive Metabolic Panel  -     Protime-INR  -     APTT  -     TSH  -     T4, Free  -     T3, Free  -     ECG 12 Lead  -UA  -low risk for moderate risk procedure, proceed to OR  -this note and associated EKG and labs will be mailed to Dr. Macias for his consultation               Please note that portions of this note were completed with a voice recognition program. Efforts were made to edit the dictations, but occasionally words are mistranscribed.

## 2021-01-04 DIAGNOSIS — F41.8 DEPRESSION WITH ANXIETY: ICD-10-CM

## 2021-01-04 RX ORDER — BUPROPION HYDROCHLORIDE 150 MG/1
150 TABLET, EXTENDED RELEASE ORAL 2 TIMES DAILY
Qty: 180 TABLET | Refills: 4 | Status: SHIPPED | OUTPATIENT
Start: 2021-01-04 | End: 2022-03-14 | Stop reason: SDUPTHER

## 2021-02-24 ENCOUNTER — LAB (OUTPATIENT)
Dept: LAB | Facility: HOSPITAL | Age: 56
End: 2021-02-24

## 2021-02-24 ENCOUNTER — OFFICE VISIT (OUTPATIENT)
Dept: ENDOCRINOLOGY | Facility: CLINIC | Age: 56
End: 2021-02-24

## 2021-02-24 VITALS
BODY MASS INDEX: 23.53 KG/M2 | WEIGHT: 141.2 LBS | TEMPERATURE: 98.2 F | DIASTOLIC BLOOD PRESSURE: 78 MMHG | HEIGHT: 65 IN | SYSTOLIC BLOOD PRESSURE: 128 MMHG

## 2021-02-24 DIAGNOSIS — R79.89 ELEVATED TESTOSTERONE LEVEL IN FEMALE: ICD-10-CM

## 2021-02-24 DIAGNOSIS — M19.90 ARTHRITIS: ICD-10-CM

## 2021-02-24 DIAGNOSIS — R79.89 ELEVATED TESTOSTERONE LEVEL IN FEMALE: Primary | ICD-10-CM

## 2021-02-24 LAB
ESTRADIOL SERPL HS-MCNC: <5 PG/ML
FSH SERPL-ACNC: 82.4 MIU/ML
LH SERPL-ACNC: 44 MIU/ML

## 2021-02-24 PROCEDURE — 82670 ASSAY OF TOTAL ESTRADIOL: CPT

## 2021-02-24 PROCEDURE — 99204 OFFICE O/P NEW MOD 45 MIN: CPT | Performed by: INTERNAL MEDICINE

## 2021-02-24 PROCEDURE — 84403 ASSAY OF TOTAL TESTOSTERONE: CPT

## 2021-02-24 PROCEDURE — 83002 ASSAY OF GONADOTROPIN (LH): CPT

## 2021-02-24 PROCEDURE — 83001 ASSAY OF GONADOTROPIN (FSH): CPT

## 2021-02-24 RX ORDER — MELOXICAM 15 MG/1
TABLET ORAL
Qty: 90 TABLET | Refills: 3 | Status: SHIPPED | OUTPATIENT
Start: 2021-02-24 | End: 2022-04-11

## 2021-02-24 NOTE — ASSESSMENT & PLAN NOTE
She had very high testosterone level last fall.  I would like to recheck labs today.  Since the DHEA-S wasn't high, I suspect an ovarian source.  I would like to get tranvaginal u/s done to look at her remaining ovary.  If this is okay, then schedule CT of adrenals.

## 2021-02-24 NOTE — PROGRESS NOTES
Subjective   Rebeka Harding is a 55 y.o. female. Establish Care, Thyroid Problem, and Abnormal Lab        History of Present Illness     She was sent by her PCP due to elevated serum testosterone level.  She had labs done 10/28/2020.  The total testosterone was 1857 (<40).  The free testo was 28.2 (<4.2).  She has noted deepening voice.  She notes hair loss on her head.  She notes coarse dark hair on her arms, back, neck and face.  She has been shaving, plucking and using Bains.  She had hysterectomy at age 22.  She has one ovary left.  She denies any testosterone use currently.  She was on testo and progesterone cream but this was stopped about 8 years ago.  Recent labs showed low normal DHEA-S.    She has h/o hypothyroidism.  She is currently on T4 50mcg qd.  She is taking this correctly.  She isn't taking any interfering meds concurrently.  She denies any h/o goiter.  She denies any compressive sxs.  Recent TSH was 0.65.    She is seeing rheumatology for joint pain - undergoing workup currently.      The following portions of the patient's history were reviewed and updated as appropriate: allergies, current medications, past family history, past medical history, past social history, past surgical history and problem list.    Review of Systems   Constitutional: Positive for activity change.   HENT: Positive for congestion, hearing loss, postnasal drip, rhinorrhea, sinus pressure, tinnitus and voice change.    Eyes: Negative.    Respiratory: Negative.    Cardiovascular: Positive for palpitations.   Gastrointestinal: Negative.    Endocrine: Positive for heat intolerance and polydipsia.   Genitourinary: Positive for dysuria.   Musculoskeletal: Positive for arthralgias, back pain, gait problem, joint swelling, myalgias, neck pain and neck stiffness.   Skin: Negative.    Allergic/Immunologic: Positive for environmental allergies and food allergies.   Hematological: Negative.    Psychiatric/Behavioral: Positive for  "decreased concentration and sleep disturbance. The patient is nervous/anxious.        Objective   Visit Vitals  /78   Temp 98.2 °F (36.8 °C) (Infrared)   Ht 165.1 cm (65\")   Wt 64 kg (141 lb 3.2 oz)   BMI 23.50 kg/m²      Physical Exam  Constitutional:       Appearance: Normal appearance.   HENT:      Head: Normocephalic and atraumatic.   Eyes:      Extraocular Movements: Extraocular movements intact.      Conjunctiva/sclera: Conjunctivae normal.      Pupils: Pupils are equal, round, and reactive to light.   Neck:      Musculoskeletal: Normal range of motion and neck supple.      Thyroid: No thyroid mass, thyromegaly or thyroid tenderness.   Cardiovascular:      Rate and Rhythm: Normal rate and regular rhythm.      Pulses: Normal pulses.      Heart sounds: Normal heart sounds.   Pulmonary:      Effort: Pulmonary effort is normal.      Breath sounds: Normal breath sounds.   Abdominal:      General: Bowel sounds are normal.      Palpations: Abdomen is soft.   Musculoskeletal: Normal range of motion.   Lymphadenopathy:      Cervical: No cervical adenopathy.   Skin:     General: Skin is warm and dry.   Neurological:      General: No focal deficit present.      Mental Status: She is alert.   Psychiatric:         Mood and Affect: Mood normal.         Behavior: Behavior normal.         Thought Content: Thought content normal.         Judgment: Judgment normal.         Assessment/Plan     Diagnoses and all orders for this visit:    1. Elevated testosterone level in female (Primary)  Assessment & Plan:  She had very high testosterone level last fall.  I would like to recheck labs today.  Since the DHEA-S wasn't high, I suspect an ovarian source.  I would like to get tranvaginal u/s done to look at her remaining ovary.  If this is okay, then schedule CT of adrenals.      Orders:  -     Testosterone, Total, Women, Children, and Hypogonadal Males; Future  -     FSH & LH; Future  -     Estradiol; Future  -     US non-ob " transvaginal; Future               Return for Will schedule follow up based on results from labs and imaging..    Rahul Morel MD   02/24/2021

## 2021-02-24 NOTE — TELEPHONE ENCOUNTER
Last Office Visit: 12/30/2020  Next Office Visit: none scheduled    Labs completed in past 6 months? yes  Labs completed in past year? yes    Last Refill Date: 03/18/2020  Quantity: 90  Refills: 3     Pharmacy:   [facility] - [place], [place] - [address] [address] - [phone]  - [phone] FX   Phone:  [phone]   Fax:  [phone]   Address:  [address] [place], [place] [id number]

## 2021-03-08 LAB — TESTOST SERPL-MCNC: 50.6 NG/DL

## 2021-04-06 ENCOUNTER — HOSPITAL ENCOUNTER (OUTPATIENT)
Dept: MRI IMAGING | Facility: HOSPITAL | Age: 56
Discharge: HOME | End: 2021-04-06
Admitting: ORTHOPAEDIC SURGERY

## 2021-04-06 DIAGNOSIS — M19.011 ARTHRITIS OF RIGHT ACROMIOCLAVICULAR JOINT: ICD-10-CM

## 2021-04-06 DIAGNOSIS — M25.511 ACUTE PAIN OF RIGHT SHOULDER: ICD-10-CM

## 2021-04-06 DIAGNOSIS — IMO0002 DISORDER OF ROTATOR CUFF SYNDROME OF RIGHT SHOULDER AND ALLIED DISORDER: ICD-10-CM

## 2021-04-06 DIAGNOSIS — M75.31 CALCIFIC TENDINITIS OF RIGHT SHOULDER: ICD-10-CM

## 2021-04-06 PROCEDURE — 73221 MRI JOINT UPR EXTREM W/O DYE: CPT

## 2021-04-13 ENCOUNTER — OFFICE VISIT (OUTPATIENT)
Dept: ORTHOPEDIC SURGERY | Facility: CLINIC | Age: 56
End: 2021-04-13

## 2021-04-13 VITALS
SYSTOLIC BLOOD PRESSURE: 149 MMHG | HEIGHT: 65 IN | HEART RATE: 69 BPM | WEIGHT: 142.2 LBS | BODY MASS INDEX: 23.69 KG/M2 | DIASTOLIC BLOOD PRESSURE: 76 MMHG

## 2021-04-13 DIAGNOSIS — M75.31 CALCIFIC TENDINITIS OF RIGHT SHOULDER: ICD-10-CM

## 2021-04-13 DIAGNOSIS — M25.511 ACUTE PAIN OF RIGHT SHOULDER: Primary | ICD-10-CM

## 2021-04-13 DIAGNOSIS — M75.121 COMPLETE TEAR OF RIGHT ROTATOR CUFF, UNSPECIFIED WHETHER TRAUMATIC: ICD-10-CM

## 2021-04-13 PROCEDURE — 99214 OFFICE O/P EST MOD 30 MIN: CPT | Performed by: ORTHOPAEDIC SURGERY

## 2021-04-13 PROCEDURE — 20610 DRAIN/INJ JOINT/BURSA W/O US: CPT | Performed by: ORTHOPAEDIC SURGERY

## 2021-04-13 RX ORDER — TRIAMCINOLONE ACETONIDE 40 MG/ML
40 INJECTION, SUSPENSION INTRA-ARTICULAR; INTRAMUSCULAR
Status: COMPLETED | OUTPATIENT
Start: 2021-04-13 | End: 2021-04-13

## 2021-04-13 RX ORDER — LIDOCAINE HYDROCHLORIDE 10 MG/ML
3 INJECTION, SOLUTION EPIDURAL; INFILTRATION; INTRACAUDAL; PERINEURAL
Status: COMPLETED | OUTPATIENT
Start: 2021-04-13 | End: 2021-04-13

## 2021-04-13 RX ADMIN — LIDOCAINE HYDROCHLORIDE 3 ML: 10 INJECTION, SOLUTION EPIDURAL; INFILTRATION; INTRACAUDAL; PERINEURAL at 11:31

## 2021-04-13 RX ADMIN — TRIAMCINOLONE ACETONIDE 40 MG: 40 INJECTION, SUSPENSION INTRA-ARTICULAR; INTRAMUSCULAR at 11:31

## 2021-04-13 NOTE — PROGRESS NOTES
"    [facility] Note        Subjective     CC: Follow-up of the Right Shoulder (After MRI 04/06/2021)      MISSY Harding is a 56 y.o. female.  Patient is here today for follow-up after the MRI of her right shoulder.    Overall, patient's symptoms are worsening    ROS:    Constiutional:Pt denies fever, chills, nausea, or vomiting.  MSK:as above        Objective      Past Medical History  Past Medical History:   Diagnosis Date   • Asthma    • Cancer (CMS/HCC)     cervical   • Diverticulosis    • Hx of radiation therapy 1988    FOR CERVICAL CANCER   • Migraine    • Ovarian cyst    • Thyroid disease    • Urinary tract infection          Physical Exam  /76   Pulse 69   Ht 165.1 cm (65\")   Wt 64.5 kg (142 lb 3.2 oz)   BMI 23.66 kg/m²     Body mass index is 23.66 kg/m².    Patient is well nourished and well developed.        Ortho Exam  Right shoulder: Supraspinatus 4-5 with pain subscap 5 out of 5 without pain.  Full range of motion.    Imaging/Labs/EMG Reviewed:  Imaging Results (Last 24 Hours)     ** No results found for the last 24 hours. **        MRI Shoulder Right Without Contrast  Narrative: EXAMINATION: MRI RIGHT SHOULDER WO CONTRAST - 04/06/2021     INDICATION: M25.511-Pain in right shoulder; M75.31-Calcific tendinitis  of right shoulder; M19.011-Primary osteoarthritis, right shoulder;  M75.101-Unspecified rotator cuff tear or rupture of right shoulder, not  specified as traumatic. Right shoulder pain.     TECHNIQUE: Axial proton density, sagittal T1 and STIR, coronal T1 and T2  fat-sat images of the right shoulder.     COMPARISON: Shoulder plain films 11/05/2020     FINDINGS: History indicates right shoulder pain, previous subacromial  injection for calcific tendinitis rotator cuff syndrome with brief  transient relief of symptoms. Difficulty lifting overhead. Plain film  findings suggestive of calcific tendinitis, and AC joint arthropathy.  Evaluate for rotator cuff tear.   "   There is extensive AC joint arthropathy, torn and retracted  supraspinatus to the level of the glenohumeral joint. There is a small  joint/bursal effusion. There is a suggestion of a small linear  partial-thickness distal subscapularis tear and a diffuse infraspinatus  tendinopathy. Teres minor tendon appears normal. Long head biceps tendon  and biceps anchor appear intact. Superior labrum has a heterogeneous  appearance on both coronal T1 and T2-weighted images although it is  difficult to define a tear here. There is underlying area of dense bony  sclerosis of the superior glenoid, and no evidence of glenoid or humeral  marrow edema. Axial images do not appear to show significant undermining  to support tear or dehiscence. Anterior, posterior and inferior portions  of the labrum appear to be intact. Mildly irregular appearance of the  attachment of the mid right posterior labrum appears to reflect the  presence of an underlying glenoid osteophyte. There appears to be both  edema and some atrophy of the supraspinatus muscle with mild fatty  change.     Impression: 1. Torn and retracted supraspinatus with supraspinatus muscle edema and  mild atrophy.   2. Suspected small linear partial thickness distal subscapularis tear.  3. Diffuse infraspinatus tendinopathy without obvious tear.  4. Superior labral degenerative changes without a well-defined tear.  5. Extensive AC joint arthropathy and small joint/bursal effusion.     DICTATED:   04/07/2021  EDITED/ls :   04/07/2021        This report was finalized on 4/12/2021 9:19 PM by Dr. Dm Engel MD.     We reviewed images and report of the MRI the patient's right shoulder.  Grade 2 atrophy noted of the supraspinatus muscle belly.  She has a torn and retracted supraspinatus.  Looks to be a large crescent.  Subscap is with some abnormal signal but does not appear to be torn fully.  Biceps is at home.    Assessment    Assessment:  1. Acute pain of right shoulder    2.  Calcific tendinitis of right shoulder    3. Complete tear of right rotator cuff, unspecified whether traumatic        Plan:  Recommend over the counter anti-inflammatories for pain and/or swelling  Calcific tendinitis right shoulder in the face of a large retracted supraspinatus tear with mild atrophy--we discussed treatment options and alternatives with the patient.  At this point, she is moving to [place] in the next 3 to 4 months.  She wants to avoid any type of surgery before she moves.  We will inject her today for pain relief.  We will make arrangements to get her images printed on the disc and we have advised her to go to [facility] in [place] when she moves and get settled.    Procedure Note:    I discussed with the patient the potential benefits of performing a therapeutic injections as well as potential risks including but not limited to infection, swelling, pain, bleeding, bruising, nerve/vessel damage, skin color changes, transient elevation in blood glucose levels, and fat atrophy. After informed consent and after the areas were prepped with chlorhexadine soap, ethyl chloride was used to numb the skin. Via the posterolateral approach, 3mL of 1% lidocaine followed by 40mg of Kenalog were each injected into the subacromial space of the right shoulder. The patient tolerated the procedure well. There were no complications. A sterile dressing was placed over the injection sites.        Seun Galvan MD  04/13/21  13:19 EDT      Dragon disclaimer:  Much of this encounter note is an electronic transcription/translation of spoken language to printed text. The electronic translation of spoken language may permit erroneous, or at times, nonsensical words or phrases to be inadvertently transcribed; Although I have reviewed the note for such errors, some may still exist.

## 2021-04-13 NOTE — PROGRESS NOTES
Procedure   Large Joint Arthrocentesis: R subacromial bursa  Date/Time: 4/13/2021 11:31 AM  Consent given by: patient  Site marked: site marked  Timeout: Immediately prior to procedure a time out was called to verify the correct patient, procedure, equipment, support staff and site/side marked as required   Supporting Documentation  Indications: pain   Procedure Details  Location: shoulder - R subacromial bursa  Preparation: Patient was prepped and draped in the usual sterile fashion  Needle size: 25 G  Approach: posterior  Medications administered: 3 mL lidocaine PF 1% 1 %; 40 mg triamcinolone acetonide 40 MG/ML  Patient tolerance: patient tolerated the procedure well with no immediate complications

## 2021-05-06 DIAGNOSIS — B02.8 HERPES ZOSTER WITH COMPLICATION: ICD-10-CM

## 2021-05-10 RX ORDER — GABAPENTIN 300 MG/1
CAPSULE ORAL
Qty: 90 CAPSULE | Refills: 0 | Status: SHIPPED | OUTPATIENT
Start: 2021-05-10 | End: 2021-06-10 | Stop reason: SDUPTHER

## 2021-05-26 ENCOUNTER — OFFICE VISIT (OUTPATIENT)
Dept: INTERNAL MEDICINE | Facility: CLINIC | Age: 56
End: 2021-05-26

## 2021-05-26 VITALS
TEMPERATURE: 97.2 F | OXYGEN SATURATION: 98 % | HEART RATE: 91 BPM | BODY MASS INDEX: 23.16 KG/M2 | SYSTOLIC BLOOD PRESSURE: 132 MMHG | HEIGHT: 65 IN | RESPIRATION RATE: 18 BRPM | WEIGHT: 139 LBS | DIASTOLIC BLOOD PRESSURE: 86 MMHG

## 2021-05-26 DIAGNOSIS — J01.80 ACUTE NON-RECURRENT SINUSITIS OF OTHER SINUS: Primary | ICD-10-CM

## 2021-05-26 DIAGNOSIS — R35.0 FREQUENT URINATION: ICD-10-CM

## 2021-05-26 DIAGNOSIS — N30.00 ACUTE CYSTITIS WITHOUT HEMATURIA: ICD-10-CM

## 2021-05-26 DIAGNOSIS — J45.31 MILD PERSISTENT ASTHMA WITH ACUTE EXACERBATION: ICD-10-CM

## 2021-05-26 LAB
BILIRUB BLD-MCNC: NEGATIVE MG/DL
CLARITY, POC: CLEAR
COLOR UR: YELLOW
GLUCOSE UR STRIP-MCNC: NEGATIVE MG/DL
KETONES UR QL: NEGATIVE
LEUKOCYTE EST, POC: ABNORMAL
NITRITE UR-MCNC: NEGATIVE MG/ML
PH UR: 7 [PH] (ref 5–8)
PROT UR STRIP-MCNC: NEGATIVE MG/DL
RBC # UR STRIP: NEGATIVE /UL
SP GR UR: 1 (ref 1–1.03)
UROBILINOGEN UR QL: NORMAL

## 2021-05-26 PROCEDURE — 87186 SC STD MICRODIL/AGAR DIL: CPT | Performed by: NURSE PRACTITIONER

## 2021-05-26 PROCEDURE — 81003 URINALYSIS AUTO W/O SCOPE: CPT | Performed by: NURSE PRACTITIONER

## 2021-05-26 PROCEDURE — 99214 OFFICE O/P EST MOD 30 MIN: CPT | Performed by: NURSE PRACTITIONER

## 2021-05-26 PROCEDURE — 87086 URINE CULTURE/COLONY COUNT: CPT | Performed by: NURSE PRACTITIONER

## 2021-05-26 PROCEDURE — 87077 CULTURE AEROBIC IDENTIFY: CPT | Performed by: NURSE PRACTITIONER

## 2021-05-26 RX ORDER — PREDNISONE 10 MG/1
TABLET ORAL
Qty: 21 TABLET | Refills: 0 | Status: SHIPPED | OUTPATIENT
Start: 2021-05-26

## 2021-05-26 RX ORDER — ALBUTEROL SULFATE 90 UG/1
2 AEROSOL, METERED RESPIRATORY (INHALATION) EVERY 4 HOURS PRN
Qty: 18 G | Refills: 5 | Status: SHIPPED | OUTPATIENT
Start: 2021-05-26

## 2021-05-26 RX ORDER — AMOXICILLIN AND CLAVULANATE POTASSIUM 875; 125 MG/1; MG/1
1 TABLET, FILM COATED ORAL 2 TIMES DAILY
Qty: 20 TABLET | Refills: 0 | Status: SHIPPED | OUTPATIENT
Start: 2021-05-26 | End: 2022-03-14

## 2021-05-26 NOTE — PROGRESS NOTES
"Chief Complaint   Patient presents with   • Sinus Problem     x1 week    • Asthma     refill.    • Urinary Tract Infection     x1 week        HPI  Rebeka Harding is a 56 y.o. female presents with sinus congestion x1 week.  She has sinus pressure.  She states that even her teeth hurt.  She states her asthma has also flared up and she is unable to get it under control.  She has been using an Albuterol inhaler that  in 2019.  Pt denies fever, body aches, and chills.    Pt also complains of difficulty voiding.  She starting to have lower abdominal pain.  She has tried AZO without any relief.  Denies any flank pain.  This started a few days ago.      The following portions of the patient's history were reviewed and updated as appropriate: allergies, current medications, past family history, past medical history, past social history, past surgical history and problem list.    Subjective  Review of Systems   Constitutional: Negative for activity change, appetite change and fatigue.   HENT: Positive for congestion, ear pain (\"popping\"), sinus pressure and sneezing. Negative for sore throat.    Respiratory: Positive for cough and wheezing. Negative for shortness of breath.    Cardiovascular: Negative for chest pain and leg swelling.   Gastrointestinal: Positive for abdominal pain.   Genitourinary: Positive for difficulty urinating. Negative for dysuria.   Neurological: Negative for dizziness, weakness and confusion.   Psychiatric/Behavioral: Negative for behavioral problems and decreased concentration.       Objective  Visit Vitals  /86   Pulse 91   Temp 97.2 °F (36.2 °C) (Temporal)   Resp 18   Ht 165.1 cm (65\")   Wt 63 kg (139 lb)   SpO2 98%   BMI 23.13 kg/m²        Physical Exam  Vitals and nursing note reviewed.   HENT:      Head: Normocephalic.      Right Ear: Ear canal and external ear normal.      Left Ear: Ear canal and external ear normal. Tympanic membrane is bulging.      Ears:      Comments: Both TMs " dull with fluid behind them     Nose:      Right Sinus: Maxillary sinus tenderness present.      Left Sinus: Maxillary sinus tenderness present.   Eyes:      Extraocular Movements: Extraocular movements intact.      Conjunctiva/sclera: Conjunctivae normal.      Pupils: Pupils are equal, round, and reactive to light.   Cardiovascular:      Rate and Rhythm: Normal rate and regular rhythm.      Pulses: Normal pulses.      Heart sounds: Normal heart sounds.   Pulmonary:      Effort: Pulmonary effort is normal.      Breath sounds: Normal breath sounds. No wheezing, rhonchi or rales.   Abdominal:      General: There is no distension.      Tenderness: There is abdominal tenderness (suprapubic). There is no right CVA tenderness, left CVA tenderness, guarding or rebound.   Musculoskeletal:      Cervical back: Normal range of motion.   Skin:     General: Skin is warm and dry.      Capillary Refill: Capillary refill takes less than 2 seconds.   Neurological:      General: No focal deficit present.      Mental Status: She is alert and oriented to person, place, and time.   Psychiatric:         Mood and Affect: Mood normal.         Behavior: Behavior normal.          Procedures   Results for orders placed or performed in visit on 05/26/21   POCT urinalysis dipstick, automated    Specimen: Urine   Result Value Ref Range    Color Yellow Yellow, Straw, Dark Yellow, Krista    Clarity, UA Clear Clear    Specific Gravity  1.005 1.005 - 1.030    pH, Urine 7.0 5.0 - 8.0    Leukocytes 500 Gavino/ul (A) Negative    Nitrite, UA Negative Negative    Protein, POC Negative Negative mg/dL    Glucose, UA Negative Negative, 1000 mg/dL (3+) mg/dL    Ketones, UA Negative Negative    Urobilinogen, UA Normal Normal    Bilirubin Negative Negative    Blood, UA Negative Negative     Assessment and Plan  Diagnoses and all orders for this visit:    1. Acute non-recurrent sinusitis of other sinus (Primary)  -     amoxicillin-clavulanate (Augmentin) 875-125 MG  per tablet; Take 1 tablet by mouth 2 (Two) Times a Day.  Dispense: 20 tablet; Refill: 0    2. Frequent urination  -     POCT urinalysis dipstick, automated    3. Acute cystitis without hematuria  -     Urine Culture - Urine, Urine, Clean Catch; Future    4. Mild persistent asthma with acute exacerbation  -     albuterol sulfate HFA (Ventolin HFA) 108 (90 Base) MCG/ACT inhaler; Inhale 2 puffs Every 4 (Four) Hours As Needed for Wheezing.  Dispense: 18 g; Refill: 5  -     predniSONE (DELTASONE) 10 MG (21) dose pack; Use as directed on package  Dispense: 21 tablet; Refill: 0    increase water intake    Return if symptoms worsen or fail to improve.    Rafat Martinez, APRN

## 2021-05-28 DIAGNOSIS — A49.8 ESCHERICHIA COLI INFECTION: Primary | ICD-10-CM

## 2021-05-28 LAB — BACTERIA SPEC AEROBE CULT: ABNORMAL

## 2021-05-28 RX ORDER — NITROFURANTOIN 25; 75 MG/1; MG/1
100 CAPSULE ORAL EVERY 12 HOURS SCHEDULED
Qty: 14 CAPSULE | Refills: 0 | Status: SHIPPED | OUTPATIENT
Start: 2021-05-28 | End: 2021-06-04

## 2021-06-10 DIAGNOSIS — B02.8 HERPES ZOSTER WITH COMPLICATION: ICD-10-CM

## 2021-06-11 RX ORDER — GABAPENTIN 300 MG/1
300 CAPSULE ORAL 3 TIMES DAILY
Qty: 90 CAPSULE | Refills: 0 | Status: SHIPPED | OUTPATIENT
Start: 2021-06-11 | End: 2022-03-14 | Stop reason: SDUPTHER

## 2021-06-25 DIAGNOSIS — B02.8 HERPES ZOSTER WITH COMPLICATION: ICD-10-CM

## 2021-06-25 RX ORDER — GABAPENTIN 300 MG/1
300 CAPSULE ORAL 3 TIMES DAILY
Qty: 90 CAPSULE | Refills: 0 | Status: CANCELLED | OUTPATIENT
Start: 2021-06-25

## 2021-08-13 DIAGNOSIS — G89.29 CHRONIC RIGHT SHOULDER PAIN: Primary | ICD-10-CM

## 2021-08-13 DIAGNOSIS — G89.29 CHRONIC PAIN OF RIGHT KNEE: ICD-10-CM

## 2021-08-13 DIAGNOSIS — M25.561 CHRONIC PAIN OF RIGHT KNEE: ICD-10-CM

## 2021-08-13 DIAGNOSIS — M25.511 CHRONIC RIGHT SHOULDER PAIN: Primary | ICD-10-CM

## 2022-03-14 ENCOUNTER — LAB (OUTPATIENT)
Dept: LAB | Facility: HOSPITAL | Age: 57
End: 2022-03-14

## 2022-03-14 ENCOUNTER — OFFICE VISIT (OUTPATIENT)
Dept: INTERNAL MEDICINE | Facility: CLINIC | Age: 57
End: 2022-03-14

## 2022-03-14 VITALS
WEIGHT: 157 LBS | DIASTOLIC BLOOD PRESSURE: 84 MMHG | SYSTOLIC BLOOD PRESSURE: 132 MMHG | HEART RATE: 75 BPM | TEMPERATURE: 98.4 F | OXYGEN SATURATION: 98 % | RESPIRATION RATE: 16 BRPM | HEIGHT: 65 IN | BODY MASS INDEX: 26.16 KG/M2

## 2022-03-14 DIAGNOSIS — Z13.21 ENCOUNTER FOR VITAMIN DEFICIENCY SCREENING: ICD-10-CM

## 2022-03-14 DIAGNOSIS — Z13.1 SCREENING FOR DIABETES MELLITUS: ICD-10-CM

## 2022-03-14 DIAGNOSIS — L98.9 SKIN LESION: ICD-10-CM

## 2022-03-14 DIAGNOSIS — R82.90 FOUL SMELLING URINE: ICD-10-CM

## 2022-03-14 DIAGNOSIS — B02.8 HERPES ZOSTER WITH COMPLICATION: ICD-10-CM

## 2022-03-14 DIAGNOSIS — E55.9 VITAMIN D DEFICIENCY: ICD-10-CM

## 2022-03-14 DIAGNOSIS — E03.9 HYPOTHYROIDISM (ACQUIRED): ICD-10-CM

## 2022-03-14 DIAGNOSIS — F41.8 DEPRESSION WITH ANXIETY: ICD-10-CM

## 2022-03-14 DIAGNOSIS — J01.90 ACUTE NON-RECURRENT SINUSITIS, UNSPECIFIED LOCATION: ICD-10-CM

## 2022-03-14 DIAGNOSIS — Z11.59 ENCOUNTER FOR HEPATITIS C SCREENING TEST FOR LOW RISK PATIENT: ICD-10-CM

## 2022-03-14 DIAGNOSIS — Z00.00 WELLNESS EXAMINATION: Primary | ICD-10-CM

## 2022-03-14 DIAGNOSIS — Z12.11 SCREENING FOR COLON CANCER: ICD-10-CM

## 2022-03-14 LAB
ALBUMIN SERPL-MCNC: 4.6 G/DL (ref 3.5–5.2)
ALBUMIN/GLOB SERPL: 2 G/DL
ALP SERPL-CCNC: 62 U/L (ref 39–117)
ALT SERPL W P-5'-P-CCNC: 15 U/L (ref 1–33)
ANION GAP SERPL CALCULATED.3IONS-SCNC: 11.6 MMOL/L (ref 5–15)
AST SERPL-CCNC: 15 U/L (ref 1–32)
BILIRUB BLD-MCNC: NEGATIVE MG/DL
BILIRUB SERPL-MCNC: 0.3 MG/DL (ref 0–1.2)
BUN SERPL-MCNC: 9 MG/DL (ref 6–20)
BUN/CREAT SERPL: 11.3 (ref 7–25)
CALCIUM SPEC-SCNC: 9.5 MG/DL (ref 8.6–10.5)
CHLORIDE SERPL-SCNC: 98 MMOL/L (ref 98–107)
CHOLEST SERPL-MCNC: 169 MG/DL (ref 0–200)
CLARITY, POC: ABNORMAL
CO2 SERPL-SCNC: 26.4 MMOL/L (ref 22–29)
COLOR UR: YELLOW
CREAT SERPL-MCNC: 0.8 MG/DL (ref 0.57–1)
DEPRECATED RDW RBC AUTO: 39.7 FL (ref 37–54)
EGFRCR SERPLBLD CKD-EPI 2021: 86.1 ML/MIN/1.73
ERYTHROCYTE [DISTWIDTH] IN BLOOD BY AUTOMATED COUNT: 12.1 % (ref 12.3–15.4)
EXPIRATION DATE: ABNORMAL
GLOBULIN UR ELPH-MCNC: 2.3 GM/DL
GLUCOSE SERPL-MCNC: 84 MG/DL (ref 65–99)
GLUCOSE UR STRIP-MCNC: NEGATIVE MG/DL
HBA1C MFR BLD: 5.4 % (ref 4.8–5.6)
HCT VFR BLD AUTO: 49.7 % (ref 34–46.6)
HCV AB SER DONR QL: NORMAL
HDLC SERPL-MCNC: 52 MG/DL (ref 40–60)
HGB BLD-MCNC: 16 G/DL (ref 12–15.9)
KETONES UR QL: NEGATIVE
LDLC SERPL CALC-MCNC: 108 MG/DL (ref 0–100)
LDLC/HDLC SERPL: 2.08 {RATIO}
LEUKOCYTE EST, POC: ABNORMAL
Lab: ABNORMAL
MCH RBC QN AUTO: 29.3 PG (ref 26.6–33)
MCHC RBC AUTO-ENTMCNC: 32.2 G/DL (ref 31.5–35.7)
MCV RBC AUTO: 90.9 FL (ref 79–97)
NITRITE UR-MCNC: NEGATIVE MG/ML
PH UR: 6 [PH] (ref 5–8)
PLATELET # BLD AUTO: 212 10*3/MM3 (ref 140–450)
PMV BLD AUTO: 10.8 FL (ref 6–12)
POTASSIUM SERPL-SCNC: 4.2 MMOL/L (ref 3.5–5.2)
PROT SERPL-MCNC: 6.9 G/DL (ref 6–8.5)
PROT UR STRIP-MCNC: NEGATIVE MG/DL
RBC # BLD AUTO: 5.47 10*6/MM3 (ref 3.77–5.28)
RBC # UR STRIP: NEGATIVE /UL
SODIUM SERPL-SCNC: 136 MMOL/L (ref 136–145)
SP GR UR: 1.01 (ref 1–1.03)
TRIGL SERPL-MCNC: 44 MG/DL (ref 0–150)
TSH SERPL DL<=0.05 MIU/L-ACNC: 0.35 UIU/ML (ref 0.27–4.2)
UROBILINOGEN UR QL: NORMAL
VIT B12 BLD-MCNC: 503 PG/ML (ref 211–946)
VLDLC SERPL-MCNC: 9 MG/DL (ref 5–40)
WBC NRBC COR # BLD: 8.29 10*3/MM3 (ref 3.4–10.8)

## 2022-03-14 PROCEDURE — 86803 HEPATITIS C AB TEST: CPT | Performed by: NURSE PRACTITIONER

## 2022-03-14 PROCEDURE — 80061 LIPID PANEL: CPT | Performed by: NURSE PRACTITIONER

## 2022-03-14 PROCEDURE — 82607 VITAMIN B-12: CPT | Performed by: NURSE PRACTITIONER

## 2022-03-14 PROCEDURE — 85027 COMPLETE CBC AUTOMATED: CPT | Performed by: NURSE PRACTITIONER

## 2022-03-14 PROCEDURE — 81003 URINALYSIS AUTO W/O SCOPE: CPT | Performed by: NURSE PRACTITIONER

## 2022-03-14 PROCEDURE — 99396 PREV VISIT EST AGE 40-64: CPT | Performed by: NURSE PRACTITIONER

## 2022-03-14 PROCEDURE — 82306 VITAMIN D 25 HYDROXY: CPT | Performed by: NURSE PRACTITIONER

## 2022-03-14 PROCEDURE — 36415 COLL VENOUS BLD VENIPUNCTURE: CPT | Performed by: NURSE PRACTITIONER

## 2022-03-14 PROCEDURE — 84443 ASSAY THYROID STIM HORMONE: CPT | Performed by: NURSE PRACTITIONER

## 2022-03-14 PROCEDURE — 83036 HEMOGLOBIN GLYCOSYLATED A1C: CPT | Performed by: NURSE PRACTITIONER

## 2022-03-14 PROCEDURE — 80053 COMPREHEN METABOLIC PANEL: CPT | Performed by: NURSE PRACTITIONER

## 2022-03-14 RX ORDER — AZELASTINE 1 MG/ML
2 SPRAY, METERED NASAL 2 TIMES DAILY
Qty: 30 ML | Refills: 12 | Status: SHIPPED | OUTPATIENT
Start: 2022-03-14

## 2022-03-14 RX ORDER — AMOXICILLIN AND CLAVULANATE POTASSIUM 875; 125 MG/1; MG/1
1 TABLET, FILM COATED ORAL 2 TIMES DAILY
Qty: 20 TABLET | Refills: 0 | Status: SHIPPED | OUTPATIENT
Start: 2022-03-14 | End: 2022-03-24

## 2022-03-14 RX ORDER — GABAPENTIN 300 MG/1
300 CAPSULE ORAL 3 TIMES DAILY
Qty: 270 CAPSULE | Refills: 0 | Status: SHIPPED | OUTPATIENT
Start: 2022-03-14 | End: 2022-06-28

## 2022-03-14 RX ORDER — LEVOTHYROXINE AND LIOTHYRONINE 38; 9 UG/1; UG/1
60 TABLET ORAL DAILY
Qty: 90 TABLET | Refills: 3 | Status: SHIPPED | OUTPATIENT
Start: 2022-03-14

## 2022-03-14 RX ORDER — LEVOTHYROXINE AND LIOTHYRONINE 38; 9 UG/1; UG/1
60 TABLET ORAL DAILY
COMMUNITY
End: 2022-03-14

## 2022-03-14 RX ORDER — BUPROPION HYDROCHLORIDE 150 MG/1
150 TABLET, EXTENDED RELEASE ORAL 2 TIMES DAILY
Qty: 180 TABLET | Refills: 3 | Status: SHIPPED | OUTPATIENT
Start: 2022-03-14

## 2022-03-14 NOTE — PROGRESS NOTES
Subjective   Chief Complaint   Patient presents with   • Annual Exam       Rebeka Harding is a 57 y.o. female here today for annual exam. Has several weeks of increased nasal congestion and rhinorrhea that is not improving. Has seasonal allergies but taking daily antihistamine. Feels like thyroid is stable taking Osborne daily. wellbutrin helping with depression and anxiety. Gabapentin helps with neuritis from prior shingles infection. Has foul smelling urine and frequently positive for urine leukocytes. No urinary symptoms. Has skin lesion on her back that has grown in size and sometimes itches. No shortness of breath or chest pain.     Overall healthy:  No, under stress but getting better.   Regular exercise:  Physically Active, arthritis.   Diet is well balanced:  Yes  Vitamin Supplement:  Yes  Alcohol intake:  Moderate  Tobacco use:  No  Cardiovascular risk is low:  Low  Menstrual cycle regular:  Post-menopausal  PAP:  hysterectomy  :  Overactive bladder and foul smelling urine.   Mammogram:  Up to date  Colonoscopy:  Due, No FH and Ordering Cologuard  GI:  Constipation  Regular dental exam:  Up to date  Regular eye exam:  Up to date  Immunizations up to date:  no covid or flu. declines  Wear a seatbelt regularly:  Yes  Wear sunscreen regularly when outdoors:  Yes    Review of Systems   Constitutional: Negative for activity change, appetite change, chills, diaphoresis, fatigue, fever, unexpected weight gain and unexpected weight loss.   HENT: Positive for congestion, postnasal drip, rhinorrhea and sinus pressure. Negative for ear discharge, ear pain, mouth sores, nosebleeds, sneezing and sore throat.    Eyes: Negative for pain, discharge and itching.   Respiratory: Negative for cough, chest tightness, shortness of breath and wheezing.    Cardiovascular: Negative for chest pain, palpitations and leg swelling.   Gastrointestinal: Negative for abdominal pain, constipation, diarrhea, nausea and vomiting.   Endocrine:  Negative for heat intolerance, polydipsia and polyphagia.   Genitourinary: Negative for dysuria, flank pain, frequency, hematuria and urgency.        Foul smelling urine   Musculoskeletal: Positive for myalgias. Negative for arthralgias, back pain, gait problem, joint swelling, neck pain and neck stiffness.   Skin: Positive for skin lesions. Negative for color change, pallor and rash.   Allergic/Immunologic: Positive for environmental allergies. Negative for immunocompromised state.   Neurological: Negative for seizures, speech difficulty, weakness and numbness.   Hematological: Negative for adenopathy.   Psychiatric/Behavioral: Negative for agitation, decreased concentration, sleep disturbance, suicidal ideas and depressed mood. The patient is not nervous/anxious.        Past Medical History:   Diagnosis Date   • Asthma    • Cancer (HCC)     cervical   • Diverticulosis    • Hx of radiation therapy 1988    FOR CERVICAL CANCER   • Migraine    • Ovarian cyst    • Thyroid disease    • Urinary tract infection      Past Surgical History:   Procedure Laterality Date   • AUGMENTATION MAMMAPLASTY Bilateral 2012   • BLADDER SURGERY     • BREAST CYST ASPIRATION  1998   • HYSTERECTOMY  1988   • KNEE MENISCAL REPAIR      right knee   • TONSILLECTOMY       Family History   Problem Relation Age of Onset   • Heart attack Mother    • Hyperlipidemia Mother    • Migraines Mother    • Thyroid disease Mother    • Bleeding Disorder Father    • Heart attack Father    • Hypertension Father    • Hyperlipidemia Father    • Thyroid disease Father      Social History     Tobacco Use   Smoking Status Never Smoker   Smokeless Tobacco Never Used      Social History     Substance and Sexual Activity   Alcohol Use Yes    Comment: 2 to 4 glasses a week      Allergies   Allergen Reactions   • Latex Rash   • Sulfa Antibiotics Rash       Current Outpatient Medications on File Prior to Visit   Medication Sig   • albuterol sulfate HFA (Ventolin HFA) 108  "(90 Base) MCG/ACT inhaler Inhale 2 puffs Every 4 (Four) Hours As Needed for Wheezing.   • diclofenac (VOLTAREN) 1 % gel gel Apply 4 g topically to the appropriate area as directed 4 (Four) Times a Day As Needed (pain).   • levothyroxine (SYNTHROID, LEVOTHROID) 50 MCG tablet TAKE 1 TABLET IN THE MORNING, WAIT 1 HOUR FOR FOOD OR OTHER MEDICATIONS   • Loratadine 10 MG capsule Take  by mouth.   • meloxicam (MOBIC) 15 MG tablet TAKE 1 TABLET DAILY AS NEEDED FOR PAIN OR INFLAMMATION   • mometasone (ELOCON) 0.1 % lotion Apply  topically to the appropriate area as directed Daily.   • ondansetron ODT (ZOFRAN-ODT) 8 MG disintegrating tablet Take 1/2 to 1 tablet by mouth (dissolve under tongue or swallow) every 8 hours as needed for nausea.   • predniSONE (DELTASONE) 10 MG (21) dose pack Use as directed on package   • SUMAtriptan (IMITREX) 100 MG tablet Take 1 tab po prn migraine. May repeat at 2 hr. Max 2 in 24 hr.   • [DISCONTINUED] amoxicillin-clavulanate (Augmentin) 875-125 MG per tablet Take 1 tablet by mouth 2 (Two) Times a Day.   • [DISCONTINUED] buPROPion SR (WELLBUTRIN SR) 150 MG 12 hr tablet Take 1 tablet by mouth 2 (Two) Times a Day.   • [DISCONTINUED] gabapentin (NEURONTIN) 300 MG capsule Take 1 capsule by mouth 3 (Three) Times a Day.   • [DISCONTINUED] Thyroid 60 MG PO tablet Take 60 mg by mouth Daily.     No current facility-administered medications on file prior to visit.       Objective   Vitals:    03/14/22 1111   BP: 132/84   Pulse: 75   Resp: 16   Temp: 98.4 °F (36.9 °C)   TempSrc: Temporal   SpO2: 98%   Weight: 71.2 kg (157 lb)   Height: 165.1 cm (65\")     Body mass index is 26.13 kg/m².    Physical Exam  Vitals reviewed.   Constitutional:       Appearance: Normal appearance. She is well-developed.   HENT:      Head: Normocephalic and atraumatic.      Right Ear: Hearing, tympanic membrane, ear canal and external ear normal.      Left Ear: Hearing, tympanic membrane, ear canal and external ear normal.      " Nose: Nose normal.      Mouth/Throat:      Lips: Pink.      Mouth: Mucous membranes are moist.      Pharynx: Oropharynx is clear. Uvula midline.   Eyes:      General: Lids are normal.      Extraocular Movements: Extraocular movements intact.      Conjunctiva/sclera: Conjunctivae normal.      Pupils: Pupils are equal, round, and reactive to light.   Neck:      Thyroid: No thyromegaly.      Trachea: Trachea normal.   Cardiovascular:      Rate and Rhythm: Normal rate and regular rhythm.      Pulses:           Carotid pulses are 2+ on the right side and 2+ on the left side.     Heart sounds: Normal heart sounds.   Pulmonary:      Effort: Pulmonary effort is normal. No respiratory distress.      Breath sounds: Normal breath sounds.   Abdominal:      General: Bowel sounds are normal.      Palpations: Abdomen is soft.      Tenderness: There is no abdominal tenderness.   Musculoskeletal:      Comments: Normal range of motion of all major joints   Skin:     General: Skin is warm and dry.      Capillary Refill: Capillary refill takes 2 to 3 seconds.          Neurological:      Mental Status: She is alert and oriented to person, place, and time.      GCS: GCS eye subscore is 4. GCS verbal subscore is 5. GCS motor subscore is 6.   Psychiatric:         Attention and Perception: Attention normal.         Mood and Affect: Mood normal.         Speech: Speech normal.         Behavior: Behavior normal. Behavior is cooperative.         Thought Content: Thought content normal.         Patient's Body mass index is 26.13 kg/m². indicating that she is overweight (BMI 25-29.9). Patient's (Body mass index is 26.13 kg/m².) indicates that they are overweight with health conditions that include osteoarthritis . Weight is worsening. BMI is is above average; BMI management plan is completed. We discussed portion control and increasing exercise. .        Assessment/Plan     Current Outpatient Medications:   •  albuterol sulfate HFA (Ventolin HFA)  108 (90 Base) MCG/ACT inhaler, Inhale 2 puffs Every 4 (Four) Hours As Needed for Wheezing., Disp: 18 g, Rfl: 5  •  buPROPion SR (WELLBUTRIN SR) 150 MG 12 hr tablet, Take 1 tablet by mouth 2 (Two) Times a Day., Disp: 180 tablet, Rfl: 3  •  diclofenac (VOLTAREN) 1 % gel gel, Apply 4 g topically to the appropriate area as directed 4 (Four) Times a Day As Needed (pain)., Disp: 100 g, Rfl: 3  •  gabapentin (NEURONTIN) 300 MG capsule, Take 1 capsule by mouth 3 (Three) Times a Day., Disp: 270 capsule, Rfl: 0  •  levothyroxine (SYNTHROID, LEVOTHROID) 50 MCG tablet, TAKE 1 TABLET IN THE MORNING, WAIT 1 HOUR FOR FOOD OR OTHER MEDICATIONS, Disp: 90 tablet, Rfl: 4  •  Loratadine 10 MG capsule, Take  by mouth., Disp: , Rfl:   •  meloxicam (MOBIC) 15 MG tablet, TAKE 1 TABLET DAILY AS NEEDED FOR PAIN OR INFLAMMATION, Disp: 90 tablet, Rfl: 3  •  mometasone (ELOCON) 0.1 % lotion, Apply  topically to the appropriate area as directed Daily., Disp: 90 mL, Rfl: 1  •  ondansetron ODT (ZOFRAN-ODT) 8 MG disintegrating tablet, Take 1/2 to 1 tablet by mouth (dissolve under tongue or swallow) every 8 hours as needed for nausea., Disp: 30 tablet, Rfl: 1  •  predniSONE (DELTASONE) 10 MG (21) dose pack, Use as directed on package, Disp: 21 tablet, Rfl: 0  •  SUMAtriptan (IMITREX) 100 MG tablet, Take 1 tab po prn migraine. May repeat at 2 hr. Max 2 in 24 hr., Disp: 36 tablet, Rfl: 1  •  amoxicillin-clavulanate (Augmentin) 875-125 MG per tablet, Take 1 tablet by mouth 2 (Two) Times a Day for 10 days. Take with food., Disp: 20 tablet, Rfl: 0  •  azelastine (ASTELIN) 0.1 % nasal spray, 2 sprays into the nostril(s) as directed by provider 2 (Two) Times a Day. Use in each nostril as directed, Disp: 30 mL, Rfl: 12  •  Thyroid (ARMOUR THYROID) 60 MG PO tablet, Take 1 tablet by mouth Daily., Disp: 90 tablet, Rfl: 3    Problem List Items Addressed This Visit        Endocrine and Metabolic    Hypothyroidism (acquired)    Relevant Medications    Thyroid  (ARMOUR THYROID) 60 MG PO tablet    Other Relevant Orders    TSH Rfx On Abnormal To Free T4       Mental Health    Depression with anxiety    Relevant Medications    buPROPion SR (WELLBUTRIN SR) 150 MG 12 hr tablet      Other Visit Diagnoses     Wellness examination    -  Primary  Patient will continue to eat a well-balanced diet, drink adequate amount of water, exercise at least 3 times weekly, and get adequate rest.  Suggested a multivitamin daily.  Discussed future preventative screenings and immunizations.      Relevant Orders    CBC (No Diff)    Comprehensive Metabolic Panel    Lipid Panel    Hemoglobin A1c    TSH Rfx On Abnormal To Free T4    Vitamin B12 & Folate    Vitamin D 25 Hydroxy    Hepatitis C Antibody    Acute non-recurrent sinusitis, unspecified location        Relevant Medications    azelastine (ASTELIN) 0.1 % nasal spray    amoxicillin-clavulanate (Augmentin) 875-125 MG per tablet    Foul smelling urine        Relevant Orders    POCT urinalysis dipstick, automated (Completed)    Herpes zoster with complication        Relevant Medications    gabapentin (NEURONTIN) 300 MG capsule    Skin lesion        Relevant Orders    Ambulatory Referral to Dermatology (Completed)    Vitamin D deficiency        Relevant Orders    Vitamin D 25 Hydroxy    Screening for colon cancer        Relevant Orders    Cologuard - Stool, Per Rectum    Screening for diabetes mellitus        Relevant Orders    Hemoglobin A1c    Encounter for vitamin deficiency screening        Relevant Orders    Vitamin D 25 Hydroxy    Hepatitis C Antibody    Encounter for hepatitis C screening test for low risk patient        Relevant Orders    Hepatitis C Antibody               Counseling was given to patient for the following topics: appropriate exercise, healthy eating habits, disease prevention, risk factors for cancer, importance of self breast exam and breast health, importance of immunizations, including risks and benefits, bone health,  sun safety and seatbelt use.      Plan of care reviewed with the patient at the conclusion of today's visit.  Education was provided regarding diagnosis, management, and any prescribed or recommended OTC medications.  Patient verbalized understanding of and agreement with management plan.     Return if symptoms worsen or fail to improve.      Maddy Lujan, APRN

## 2022-03-15 LAB — 25(OH)D3 SERPL-MCNC: 197 NG/ML (ref 30–100)

## 2022-04-10 DIAGNOSIS — M19.90 ARTHRITIS: ICD-10-CM

## 2022-04-11 RX ORDER — MELOXICAM 15 MG/1
TABLET ORAL
Qty: 90 TABLET | Refills: 3 | Status: SHIPPED | OUTPATIENT
Start: 2022-04-11

## 2022-05-17 ENCOUNTER — OFFICE VISIT (OUTPATIENT)
Dept: ORTHOPEDIC SURGERY | Facility: CLINIC | Age: 57
End: 2022-05-17

## 2022-05-17 VITALS
DIASTOLIC BLOOD PRESSURE: 83 MMHG | HEIGHT: 65 IN | BODY MASS INDEX: 24.62 KG/M2 | SYSTOLIC BLOOD PRESSURE: 123 MMHG | WEIGHT: 147.8 LBS

## 2022-05-17 DIAGNOSIS — M19.012 ARTHRITIS OF LEFT ACROMIOCLAVICULAR JOINT: ICD-10-CM

## 2022-05-17 DIAGNOSIS — M25.512 LEFT SHOULDER PAIN, UNSPECIFIED CHRONICITY: Primary | ICD-10-CM

## 2022-05-17 DIAGNOSIS — S49.92XA INJURY OF LEFT SHOULDER, INITIAL ENCOUNTER: ICD-10-CM

## 2022-05-17 PROCEDURE — 99214 OFFICE O/P EST MOD 30 MIN: CPT | Performed by: ORTHOPAEDIC SURGERY

## 2022-05-17 RX ORDER — TRAMADOL HYDROCHLORIDE 50 MG/1
50 TABLET ORAL EVERY 6 HOURS PRN
Qty: 30 TABLET | Refills: 0 | Status: SHIPPED | OUTPATIENT
Start: 2022-05-17

## 2022-05-17 RX ORDER — DIAZEPAM 5 MG/1
5 TABLET ORAL
Qty: 1 TABLET | Refills: 0 | Status: SHIPPED | OUTPATIENT
Start: 2022-05-17 | End: 2022-05-17

## 2022-05-17 NOTE — PROGRESS NOTES
[facility] [place] [facility] Note        Subjective     CC: Follow-up (New Problem: Left Shoulder Pain)  .    HPI  Rebeka Harding is a 57 y.o. female who presents with new problem of: left shoulder pain.  Onset: pulling injury. The issue has been ongoing for 3 week(s). Pain is a 8/10 on the pain scale. Pain is described as aching, throbbing, stabbing and shooting. Associated symptoms include pain, popping, grinding and stiffness. The pain is worse with lying on affected side and any movement of the joint; ice, heat and elevating the extremity improve the pain. Previous treatments have included: NSAIDS.    I have reviewed the following portions of the patient's history:History of Present Illness and review of systems.    Patient here today for new problem day regarding her left shoulder.  She has a bad right rotator cuff and as a result, has been doing more with her left shoulder.  She went to start her lawnmower about 3 weeks ago and felt a pop in her shoulder.  Since that time she has had 8 out of 10 pain she has radiating pain down the anterior medial arm and biceps.  She is tried some anti-inflammatories without a lot of benefit.  She has trouble sleeping.  Was scheduled to move to [place] but they offered her  more money and they are putting it off until the end of the year.    ROS:    Constiutional:Pt denies fever, chills, nausea, or vomiting.  MSK:as above        Objective      Past Medical History  Past Medical History:   Diagnosis Date   • Arthritis of back 2010   • Asthma    • Bursitis of hip 2015   • Cancer (HCC)     cervical   • Cervical disc disorder 2007   • Diverticulosis    • Hx of radiation therapy 1988    FOR CERVICAL CANCER   • Knee swelling 2018   • Lumbosacral disc disease 2007   • Migraine    • Neuroma of foot 2016   • Ovarian cyst    • Periarthritis of shoulder 2020   • Rotator cuff syndrome 2019   • Tear of meniscus of knee 2005   • Thyroid disease    • Urinary tract  "infection    • Wrist sprain 2018         Physical Exam  /83   Ht 165.1 cm (65\")   Wt 67 kg (147 lb 12.8 oz)   BMI 24.60 kg/m²     Body mass index is 24.6 kg/m².    Patient is well nourished and well developed.        Ortho Exam  Musculoskeletal   Upper Extremity   Left Shoulder     Inspection and Palpation:     Medial Border Scapular Tenderness-none    AC Joint Tenderness -minimal    Sensation is normal    Examination reveals no ecchymosis.        Strength and Tone:    Supraspinatus -4-5 with pain    External Rotators-5/5 with pain    Infraspinatus - 5/5    Subscapularis -4-5 with pain    Deltoid - 5/5     Range of Motion      Left Shoulder:    Internal Rotation: ROM - L4    External Rotation: AROM - 60 degrees    Elevation through flexion: AROM - 140 degrees        Impingement   Left shoulder    Page-Nickolas impingement test positive    Neer impingement test negative     Functional Testing   Left shoulder    AC crossover adduction test minimal    Speeds test negative    Uppercut test negative    O'Briens test negative    Drop arm sign negative    Apprehension relocation negative        Imaging/Labs/EMG Reviewed:  Imaging Results (Last 24 Hours)     Procedure Component Value Units Date/Time    XR Shoulder 2+ View Left [[id number]] Resulted: 05/17/22 0943     Updated: 05/17/22 0944    Narrative:      Left Shoulder X-Ray    Indication: Pain    Study:  AP, axillary lateral, and scapular Y views    Comparison: None    Findings:  No acute fractures are visualized  No bony lesions are visualized.  Normal soft tissue appearance  AC joint: Severe hypertrophic joint space narrowing  Glenohumeral joint: Minimal joint space narrowing  Acromion type: 3      Impression:    No acute bony abnormalities noted  Type III acromion  Severe AC joint arthritis              Assessment    Assessment:  1. Left shoulder pain, unspecified chronicity    2. Injury of left shoulder, initial encounter    3. Arthritis of left " acromioclavicular joint        Plan:  Recommend over the counter anti-inflammatories for pain and/or swelling  Left shoulder injury with AC joint arthritis--patient's significantly weak and I am concerned that she has torn her cuff.  An MRI will be requested.  See her back to review the study.  We will get her a Valium to help with claustrophobia and some tramadol to help with discomfort and pain      Seun Galvan MD  05/17/22  13:16 EDT      Dictated Utilizing Dragon Dictation.

## 2022-05-20 ENCOUNTER — HOSPITAL ENCOUNTER (OUTPATIENT)
Dept: MRI IMAGING | Facility: HOSPITAL | Age: 57
Discharge: HOME | End: 2022-05-20
Admitting: ORTHOPAEDIC SURGERY

## 2022-05-20 DIAGNOSIS — M19.012 ARTHRITIS OF LEFT ACROMIOCLAVICULAR JOINT: ICD-10-CM

## 2022-05-20 DIAGNOSIS — M25.512 LEFT SHOULDER PAIN, UNSPECIFIED CHRONICITY: ICD-10-CM

## 2022-05-20 DIAGNOSIS — S49.92XA INJURY OF LEFT SHOULDER, INITIAL ENCOUNTER: ICD-10-CM

## 2022-05-20 PROCEDURE — 73221 MRI JOINT UPR EXTREM W/O DYE: CPT

## 2022-05-23 ENCOUNTER — TELEPHONE (OUTPATIENT)
Dept: ORTHOPEDIC SURGERY | Facility: CLINIC | Age: 57
End: 2022-05-23

## 2022-05-23 DIAGNOSIS — S46.012D TRAUMATIC COMPLETE TEAR OF LEFT ROTATOR CUFF, SUBSEQUENT ENCOUNTER: Primary | ICD-10-CM

## 2022-05-23 NOTE — TELEPHONE ENCOUNTER
Spoke with patient over the phone this morning regarding her MRI results which I have reviewed.  Patient appears to have a large supra and infraspinatus tear.  We discussed the likelihood of this improving on its own without surgical intervention which is low overall.  Plan will be for left shoulder arthroscopy, possible biceps tenodesis, we will discuss the need for distal clavicle resection, and we will also plan on supra and infraspinatus repair versus partial repair.  We will discussed the risk, benefits, potential hazards of the surgery when she comes back for her preop appointment sometime in September after she takes several medications that she is already planned that are nonrefundable.  She has about a steroid injection and we told her that at this point, given that she has made the decision to proceed with surgery, steroid injection would only increase her risk of local infection and revision and nonhealing.

## 2022-05-25 ENCOUNTER — TRANSCRIBE ORDERS (OUTPATIENT)
Dept: MAMMOGRAPHY | Facility: HOSPITAL | Age: 57
End: 2022-05-25

## 2022-05-25 DIAGNOSIS — Z12.31 VISIT FOR SCREENING MAMMOGRAM: Primary | ICD-10-CM

## 2022-06-28 DIAGNOSIS — B02.8 HERPES ZOSTER WITH COMPLICATION: ICD-10-CM

## 2022-06-28 RX ORDER — GABAPENTIN 300 MG/1
CAPSULE ORAL
Qty: 270 CAPSULE | Refills: 0 | Status: SHIPPED | OUTPATIENT
Start: 2022-06-28

## 2022-08-31 DIAGNOSIS — Z12.11 SCREENING FOR COLON CANCER: ICD-10-CM

## 2022-09-14 ENCOUNTER — APPOINTMENT (OUTPATIENT)
Dept: PREADMISSION TESTING | Facility: HOSPITAL | Age: 57
End: 2022-09-14

## 2022-09-16 ENCOUNTER — TELEPHONE (OUTPATIENT)
Dept: ORTHOPEDIC SURGERY | Age: 57
End: 2022-09-16

## 2022-09-16 NOTE — TELEPHONE ENCOUNTER
Called and LVM to return call. We received an imaging disc, with no other notes. Calling to follow up.

## 2022-10-07 ENCOUNTER — APPOINTMENT (RX ONLY)
Dept: URBAN - NONMETROPOLITAN AREA CLINIC 1 | Facility: CLINIC | Age: 57
Setting detail: DERMATOLOGY
End: 2022-10-07

## 2022-10-07 DIAGNOSIS — Z85.828 PERSONAL HISTORY OF OTHER MALIGNANT NEOPLASM OF SKIN: ICD-10-CM

## 2022-10-07 DIAGNOSIS — D22 MELANOCYTIC NEVI: ICD-10-CM

## 2022-10-07 DIAGNOSIS — D18.0 HEMANGIOMA: ICD-10-CM

## 2022-10-07 DIAGNOSIS — L57.0 ACTINIC KERATOSIS: ICD-10-CM | Status: INADEQUATELY CONTROLLED

## 2022-10-07 DIAGNOSIS — L82.1 OTHER SEBORRHEIC KERATOSIS: ICD-10-CM

## 2022-10-07 DIAGNOSIS — L81.4 OTHER MELANIN HYPERPIGMENTATION: ICD-10-CM

## 2022-10-07 PROBLEM — D22.5 MELANOCYTIC NEVI OF TRUNK: Status: ACTIVE | Noted: 2022-10-07

## 2022-10-07 PROBLEM — D18.01 HEMANGIOMA OF SKIN AND SUBCUTANEOUS TISSUE: Status: ACTIVE | Noted: 2022-10-07

## 2022-10-07 PROCEDURE — 17000 DESTRUCT PREMALG LESION: CPT

## 2022-10-07 PROCEDURE — ? PRESCRIPTION MEDICATION MANAGEMENT

## 2022-10-07 PROCEDURE — 99204 OFFICE O/P NEW MOD 45 MIN: CPT | Mod: 25

## 2022-10-07 PROCEDURE — ? SUNSCREEN RECOMMENDATIONS

## 2022-10-07 PROCEDURE — ? LIQUID NITROGEN

## 2022-10-07 PROCEDURE — ? FULL BODY SKIN EXAM

## 2022-10-07 PROCEDURE — 17003 DESTRUCT PREMALG LES 2-14: CPT

## 2022-10-07 PROCEDURE — ? PRESCRIPTION

## 2022-10-07 PROCEDURE — ? COUNSELING

## 2022-10-07 RX ORDER — FLUOROURACIL 5 MG/G
1 CREAM TOPICAL BID
Qty: 40 | Refills: 1 | Status: ERX | COMMUNITY
Start: 2022-10-07

## 2022-10-07 RX ADMIN — FLUOROURACIL 1: 5 CREAM TOPICAL at 00:00

## 2022-10-07 ASSESSMENT — LOCATION SIMPLE DESCRIPTION DERM
LOCATION SIMPLE: CHEST
LOCATION SIMPLE: LEFT CHEEK
LOCATION SIMPLE: LEFT NOSE
LOCATION SIMPLE: RIGHT UPPER BACK
LOCATION SIMPLE: LEFT UPPER BACK

## 2022-10-07 ASSESSMENT — LOCATION DETAILED DESCRIPTION DERM
LOCATION DETAILED: LEFT SUPERIOR LATERAL BUCCAL CHEEK
LOCATION DETAILED: LEFT MEDIAL SUPERIOR CHEST
LOCATION DETAILED: RIGHT SUPERIOR UPPER BACK
LOCATION DETAILED: LEFT SUPERIOR MEDIAL UPPER BACK
LOCATION DETAILED: LEFT NASAL ALA

## 2022-10-07 ASSESSMENT — LOCATION ZONE DERM
LOCATION ZONE: TRUNK
LOCATION ZONE: NOSE
LOCATION ZONE: FACE

## 2022-10-07 NOTE — PROCEDURE: LIQUID NITROGEN
Consent: The patient's consent was obtained including but not limited to risks of crusting, scabbing, blistering, scarring, darker or lighter pigmentary change, recurrence, incomplete removal and infection.
Render Post-Care Instructions In Note?: no
Post-Care Instructions: I reviewed with the patient in detail post-care instructions. Patient is to wear sunprotection, and avoid picking at any of the treated lesions. Pt may apply Vaseline to crusted or scabbing areas.
Show Aperture Variable?: Yes
Number Of Freeze-Thaw Cycles: 2 freeze-thaw cycles
Duration Of Freeze Thaw-Cycle (Seconds): 2
Detail Level: Detailed

## 2022-10-07 NOTE — PROCEDURE: PRESCRIPTION MEDICATION MANAGEMENT
Detail Level: Zone
Initiate Treatment: 5FU BID X 3 weeks on back, take one week off then use BID x 3weeks on the chest
Render In Strict Bullet Format?: No

## 2022-10-11 ENCOUNTER — APPOINTMENT (RX ONLY)
Dept: URBAN - NONMETROPOLITAN AREA CLINIC 1 | Facility: CLINIC | Age: 57
Setting detail: DERMATOLOGY
End: 2022-10-11

## 2022-10-11 DIAGNOSIS — L65.9 NONSCARRING HAIR LOSS, UNSPECIFIED: ICD-10-CM | Status: INADEQUATELY CONTROLLED

## 2022-10-11 PROCEDURE — ? PRESCRIPTION

## 2022-10-11 PROCEDURE — ? TREATMENT REGIMEN

## 2022-10-11 PROCEDURE — ? FULL BODY SKIN EXAM - DECLINED

## 2022-10-11 PROCEDURE — 99214 OFFICE O/P EST MOD 30 MIN: CPT | Mod: 24

## 2022-10-11 PROCEDURE — ? ORDER TESTS

## 2022-10-11 PROCEDURE — ? PHOTO-DOCUMENTATION

## 2022-10-11 PROCEDURE — ? COUNSELING

## 2022-10-11 RX ORDER — CLOBETASOL PROPIONATE 0.5 MG/ML
1 SOLUTION TOPICAL QHS
Qty: 50 | Refills: 1 | Status: ERX | COMMUNITY
Start: 2022-10-11

## 2022-10-11 RX ADMIN — CLOBETASOL PROPIONATE 1: 0.5 SOLUTION TOPICAL at 00:00

## 2022-10-11 NOTE — PROCEDURE: TREATMENT REGIMEN
Initiate Treatment: Proscriptix shampoo, conditioner\\nAlopecia scalp solution
Plan: Patient was counseled about doing a punch biopsy or giving a lab order and treating with topical regimens at this time.
Detail Level: Zone
Continue Regimen: DHT blocker\\nNutrafol vitamins

## 2022-10-11 NOTE — PROCEDURE: ORDER TESTS
Expected Date Of Service: 10/11/2022
Bill For Surgical Tray: no
Billing Type: Third-Party Bill
Performing Laboratory: 0

## 2022-10-24 ENCOUNTER — OFFICE VISIT (OUTPATIENT)
Dept: ORTHOPEDIC SURGERY | Age: 57
End: 2022-10-24
Payer: OTHER GOVERNMENT

## 2022-10-24 VITALS — WEIGHT: 150 LBS | HEIGHT: 64 IN | BODY MASS INDEX: 25.61 KG/M2

## 2022-10-24 DIAGNOSIS — M12.811 ROTATOR CUFF TEAR ARTHROPATHY OF RIGHT SHOULDER: ICD-10-CM

## 2022-10-24 DIAGNOSIS — M19.011 DEGENERATIVE JOINT DISEASE OF RIGHT ACROMIOCLAVICULAR JOINT: ICD-10-CM

## 2022-10-24 DIAGNOSIS — M75.101 ROTATOR CUFF TEAR ARTHROPATHY OF RIGHT SHOULDER: ICD-10-CM

## 2022-10-24 DIAGNOSIS — M19.012 DEGENERATIVE JOINT DISEASE OF LEFT ACROMIOCLAVICULAR JOINT: ICD-10-CM

## 2022-10-24 DIAGNOSIS — M75.122 NONTRAUMATIC COMPLETE TEAR OF LEFT ROTATOR CUFF: Primary | ICD-10-CM

## 2022-10-24 PROCEDURE — 20610 DRAIN/INJ JOINT/BURSA W/O US: CPT | Performed by: ORTHOPAEDIC SURGERY

## 2022-10-24 PROCEDURE — 99204 OFFICE O/P NEW MOD 45 MIN: CPT | Performed by: ORTHOPAEDIC SURGERY

## 2022-10-24 RX ORDER — BUPROPION HYDROCHLORIDE 150 MG/1
1 TABLET, EXTENDED RELEASE ORAL 2 TIMES DAILY
COMMUNITY
Start: 2022-03-14

## 2022-10-24 RX ORDER — LIDOCAINE HYDROCHLORIDE 20 MG/ML
4.5 INJECTION, SOLUTION INFILTRATION; PERINEURAL ONCE
Status: COMPLETED | OUTPATIENT
Start: 2022-10-24 | End: 2022-10-24

## 2022-10-24 RX ORDER — BUPIVACAINE HYDROCHLORIDE 5 MG/ML
4.5 INJECTION, SOLUTION PERINEURAL ONCE
Status: COMPLETED | OUTPATIENT
Start: 2022-10-24 | End: 2022-10-24

## 2022-10-24 RX ORDER — METHYLPREDNISOLONE ACETATE 80 MG/ML
80 INJECTION, SUSPENSION INTRA-ARTICULAR; INTRALESIONAL; INTRAMUSCULAR; SOFT TISSUE ONCE
Status: COMPLETED | OUTPATIENT
Start: 2022-10-24 | End: 2022-10-24

## 2022-10-24 RX ORDER — LORATADINE 10 MG/1
10 TABLET ORAL DAILY
COMMUNITY

## 2022-10-24 RX ORDER — GABAPENTIN 300 MG/1
300 CAPSULE ORAL 3 TIMES DAILY
COMMUNITY
Start: 2022-06-28

## 2022-10-24 RX ORDER — MELOXICAM 15 MG/1
15 TABLET ORAL DAILY
COMMUNITY
Start: 2022-04-11

## 2022-10-24 RX ORDER — ALBUTEROL SULFATE 90 UG/1
2 AEROSOL, METERED RESPIRATORY (INHALATION) EVERY 4 HOURS PRN
COMMUNITY
Start: 2021-05-26

## 2022-10-24 RX ADMIN — BUPIVACAINE HYDROCHLORIDE 4.5 ML: 5 INJECTION, SOLUTION PERINEURAL at 16:21

## 2022-10-24 RX ADMIN — METHYLPREDNISOLONE ACETATE 80 MG: 80 INJECTION, SUSPENSION INTRA-ARTICULAR; INTRALESIONAL; INTRAMUSCULAR; SOFT TISSUE at 16:22

## 2022-10-24 RX ADMIN — LIDOCAINE HYDROCHLORIDE 4.5 ML: 20 INJECTION, SOLUTION INFILTRATION; PERINEURAL at 16:22

## 2022-10-24 NOTE — PROGRESS NOTES
Name: Nate Chavez  YOB: 1965  Gender: female  MRN: [id number]      What: Bilateral shoulder pain  How: Complicated history      Self-referred second opinion    HPI: Nate Chavez is a 62 y.o. right-hand-dominant female seen for both shoulders. She has a history of hypertension thyroid issues and asthma. She has a long history of bilateral shoulder pain. She had injections into both shoulders. The left is currently bothering her more than the right. I saw her 16 years ago for the right shoulder. She did moved to [place] and then recently moved back. She was scheduled to have the left shoulder surgery in [place]. She is here today for exam.      ROS/Meds/PSH/PMH/FH/SH: A ten system review of systems was performed and is negative other than what is in the HPI. Tobacco:  reports that she has never smoked. She has never used smokeless tobacco.  Ht 5' 4\" (1.626 m)   Wt 150 lb (68 kg)   BMI 25.75 kg/m²      Physical Examination:  She is an awake alert pleasant female ambulating without difficulty  She has a full range of cervical spine motion without radicular findings    The right shoulder has 0 to 180 degrees of active and 0 to 180 degrees passive forward elevation. Pain in the overhead position  Internal rotation is to T10. External rotation is to 40 degrees at the side. In the 90 degree abducted position 90 degrees of external and 90 degrees internal rotation  The AC joint is tender  SC joint is non-tender. Greater tuberosity is tender. Positive bicipital stress test negative Lamont sign positive  Negative O'Briens sign  negative lift-off sign  Positive positive belly press sign  Negative bear huggers sign  negative drop sign  negative hornblower's sign  No posterior glenohumeral joint line tenderness.    No evident excessive external rotation  Rotator cuff strength is 5/5 with weakness  Positive external rotation stress test.   Positive empty can sign  There is no evident anterior or posterior apprehension with a negative sulcus sign. No instability  negative external and internal Rotation lag sign  Neurovascularly intact. The left shoulder has 0 to 180 degrees of active and 0 to 180 degrees passive forward elevation. Pain in the overhead position   internal rotation is to T6. External rotation is to 40 degrees at the side. In the 90 degree abducted position 90 degrees of external and 90 degrees internal rotation  The AC joint is tender  SC joint is non-tender. Greater tuberosity is tender. Positive bicipital stress test negative Lamont sign  Negative O'Briens sign  negative lift-off sign  Negative belly press sign  Negative bear huggers sign  negative drop sign  negative hornblower's sign  No posterior glenohumeral joint line tenderness. No evident excessive external rotation  Rotator cuff strength is 5/5 with weakness positive  Positive external rotation stress test.   Positive empty can sign  There is no evident anterior or posterior apprehension with a negative sulcus sign. No instability  negative external and internal Rotation lag sign  Neurovascularly intact. Data Reviewed:      Multiple radiographs of the left shoulder from May 2022 demonstrates a type II acromion. Degenerative changes in the [facility] joint. Radiographs of the right shoulder from 2020 demonstrate a type II acromion. Degenerative changes in the [facility] joint. MRI left shoulder from May 2022 demonstrates a type II acromion. Degenerative change in the [facility] joint. A full-thickness retracted supraspinatus rotator cuff tear to the level of the glenoid. Minimal atrophy. The joint is congruent. Articular cartilage is intact. MRI right shoulder from April 2021 demonstrates a type II acromion. Degenerative changes in the [facility] joint.   High riding humeral head loss of acromiohumeral interval degenerative changes in the glenohumeral joint in combination with a full-thickness complex retracted rotator cuff tear to the level of the glenoid consistent with rotator cuff tear arthropathy. The tear extends into the subscapularis. There is atrophy. Minor Procedure:    OFFICE PROCEDURE PROGRESS NOTE    Chart reviewed for the following:   Greer Wilder MD, have reviewed the History, Physical and updated the Allergic reactions for [address] performed immediately prior to start of procedure:   Greer Wilder MD, have performed the following reviews on Virgin Schirmer prior to the start of the procedure:            * Patient was identified by name and date of birth   * Agreement on procedure being performed was verified  * Risks and Benefits explained to the patient  * Procedure site verified and marked as necessary  * Patient was positioned for comfort     Time: 1:01 PM  Date of procedure: 10/24/2022  Procedure performed by:  Ayaka Gonzalez MD    After sterile prep and drape of the left shoulder, the patient received a 9:1 injection into the subacromial space. It consisted of 4.5 mL of 2% Xylocaine, 4.5 mL of 0.5% bupivacaine and 80 mg of Depo-Medrol. A sterile dressing was applied. The patient tolerated the procedure well. Impression:   1. Nontraumatic complete tear of left rotator cuff    2. Degenerative joint disease of left acromioclavicular joint    3. Rotator cuff tear arthropathy of right shoulder    4. Degenerative joint disease of right acromioclavicular joint       Rule out rotator cuff tear arthropathy left shoulder  Hypertension  Thyroid issues  Asthma    Plan:   I discussed the problem with the patient. I discussed nonoperative versus operative intervention including injections. In regards to her right shoulder we discussed the need for reverse right total shoulder arthroplasty. We will defer that for now. We will defer injections for now. In regards to the left shoulder I discussed rotator cuff repair versus reverse left total shoulder arthroplasty.   I am concerned that her rotator cuff tear may not be repairable. We will defer surgery for now. I did inject the left shoulder. We will get her started on a shoulder rehab program.  I will recheck her back in 6 weeks  4 This is a chronic illness/condition with exacerbation and progression    Follow up: Return in about 6 weeks (around 12/5/2022).              Aleena Shah MD

## 2022-12-06 ENCOUNTER — OFFICE VISIT (OUTPATIENT)
Dept: ORTHOPEDIC SURGERY | Age: 57
End: 2022-12-06
Payer: OTHER GOVERNMENT

## 2022-12-06 VITALS — HEIGHT: 65 IN | BODY MASS INDEX: 24.16 KG/M2 | WEIGHT: 145 LBS

## 2022-12-06 DIAGNOSIS — M12.811 ROTATOR CUFF TEAR ARTHROPATHY OF RIGHT SHOULDER: ICD-10-CM

## 2022-12-06 DIAGNOSIS — M19.012 DEGENERATIVE JOINT DISEASE OF LEFT ACROMIOCLAVICULAR JOINT: ICD-10-CM

## 2022-12-06 DIAGNOSIS — M19.011 DEGENERATIVE JOINT DISEASE OF RIGHT ACROMIOCLAVICULAR JOINT: ICD-10-CM

## 2022-12-06 DIAGNOSIS — M75.101 ROTATOR CUFF TEAR ARTHROPATHY OF RIGHT SHOULDER: ICD-10-CM

## 2022-12-06 DIAGNOSIS — M75.122 NONTRAUMATIC COMPLETE TEAR OF LEFT ROTATOR CUFF: Primary | ICD-10-CM

## 2022-12-06 PROCEDURE — 99212 OFFICE O/P EST SF 10 MIN: CPT | Performed by: ORTHOPAEDIC SURGERY

## 2022-12-06 RX ORDER — MELOXICAM 15 MG/1
15 TABLET ORAL DAILY
Qty: 30 TABLET | Refills: 3 | Status: SHIPPED | OUTPATIENT
Start: 2022-12-06 | End: 2023-01-05

## 2022-12-06 NOTE — PROGRESS NOTES
Name: Marivel Tenorio  YOB: 1965  Gender: female  MRN: [id number]          HPI: aMrivel Tenorio is a 62 y.o. right-hand-dominant female seen for both shoulders. She has a history of hypertension thyroid issues and asthma. She has a long history of bilateral shoulder pain. She had injections into both shoulders. The left is currently bothering her more than the right. I saw her 16 years ago for the right shoulder. She did moved to [place] and then recently moved back. She was scheduled to have the left shoulder surgery in [place]. I injected her on her last visit. The left shoulder is doing better. Her right knee is bothering her as well. She is taking Mobic. ROS/Meds/PSH/PMH/FH/SH: A ten system review of systems was performed and is negative other than what is in the HPI. Tobacco:  reports that she has never smoked. She has never used smokeless tobacco.  Ht 5' 5\" (1.651 m)   Wt 145 lb (65.8 kg)   BMI 24.13 kg/m²      Physical Examination:  She is an awake alert pleasant female ambulating without difficulty  She has a full range of cervical spine motion without radicular findings    The right shoulder has 0 to 180 degrees of active and 0 to 180 degrees passive forward elevation. Pain in the overhead position  Internal rotation is to T10. External rotation is to 40 degrees at the side. In the 90 degree abducted position 90 degrees of external and 90 degrees internal rotation  The AC joint is tender  SC joint is non-tender. Greater tuberosity is tender. Positive bicipital stress test negative Lamont sign positive  Negative O'Briens sign  negative lift-off sign  Positive positive belly press sign  Negative bear huggers sign  negative drop sign  negative hornblower's sign  No posterior glenohumeral joint line tenderness.    No evident excessive external rotation  Rotator cuff strength is 5/5 with weakness  Positive external rotation stress test.   Positive empty can sign  There is no evident anterior or posterior apprehension with a negative sulcus sign. No instability  negative external and internal Rotation lag sign  Neurovascularly intact. The left shoulder has 0 to 180 degrees of active and 0 to 180 degrees passive forward elevation. Pain in the overhead position   internal rotation is to T6. External rotation is to 40 degrees at the side. In the 90 degree abducted position 90 degrees of external and 90 degrees internal rotation  The AC joint is tender  SC joint is non-tender. Greater tuberosity is tender. Positive bicipital stress test negative Lamont sign  Negative O'Briens sign  negative lift-off sign  Negative belly press sign  Negative bear huggers sign  negative drop sign  negative hornblower's sign  No posterior glenohumeral joint line tenderness. No evident excessive external rotation  Rotator cuff strength is 5/5 with weakness positive  Positive external rotation stress test.   Positive empty can sign  There is no evident anterior or posterior apprehension with a negative sulcus sign. No instability  negative external and internal Rotation lag sign  Neurovascularly intact. Data Reviewed:      Multiple radiographs of the left shoulder from May 2022 demonstrates a type II acromion. Degenerative changes in the [facility] joint. Radiographs of the right shoulder from 2020 demonstrate a type II acromion. Degenerative changes in the [facility] joint. MRI left shoulder from May 2022 demonstrates a type II acromion. Degenerative change in the [facility] joint. A full-thickness retracted supraspinatus rotator cuff tear to the level of the glenoid. Minimal atrophy. The joint is congruent. Articular cartilage is intact. MRI right shoulder from April 2021 demonstrates a type II acromion. Degenerative changes in the [facility] joint.   High riding humeral head loss of acromiohumeral interval degenerative changes in the glenohumeral joint in combination with a full-thickness complex retracted rotator cuff tear to the level of the glenoid consistent with rotator cuff tear arthropathy. The tear extends into the subscapularis. There is atrophy. Minor Procedure:      Impression:   1. Nontraumatic complete tear of left rotator cuff    2. Degenerative joint disease of left acromioclavicular joint    3. Rotator cuff tear arthropathy of right shoulder    4. Degenerative joint disease of right acromioclavicular joint       Rule out rotator cuff tear arthropathy left shoulder  Hypertension  Thyroid issues  Asthma    Plan:   I discussed the problem with the patient. She is doing better in regards to her shoulders. I refilled her Mobic. We will observe her for now. Her right knee is giving her trouble. I will reassess her progress back in 6 weeks at which time we will evaluate her right knee with a full knee radiographic series. I will recheck her back in 6 weeks    2. Self-limited problem    Follow up: Return in about 6 weeks (around 1/17/2023).              Rossana Paredes MD

## 2023-01-17 ENCOUNTER — OFFICE VISIT (OUTPATIENT)
Dept: ORTHOPEDIC SURGERY | Age: 58
End: 2023-01-17

## 2023-01-17 DIAGNOSIS — M17.11 PRIMARY OSTEOARTHRITIS OF RIGHT KNEE: Primary | ICD-10-CM

## 2023-01-17 DIAGNOSIS — M19.012 DEGENERATIVE JOINT DISEASE OF LEFT ACROMIOCLAVICULAR JOINT: ICD-10-CM

## 2023-01-17 DIAGNOSIS — M75.122 NONTRAUMATIC COMPLETE TEAR OF LEFT ROTATOR CUFF: ICD-10-CM

## 2023-01-17 DIAGNOSIS — M12.811 ROTATOR CUFF TEAR ARTHROPATHY OF RIGHT SHOULDER: ICD-10-CM

## 2023-01-17 DIAGNOSIS — M75.101 ROTATOR CUFF TEAR ARTHROPATHY OF RIGHT SHOULDER: ICD-10-CM

## 2023-01-17 DIAGNOSIS — M19.011 DEGENERATIVE JOINT DISEASE OF RIGHT ACROMIOCLAVICULAR JOINT: ICD-10-CM

## 2023-01-17 RX ORDER — METHYLPREDNISOLONE ACETATE 80 MG/ML
80 INJECTION, SUSPENSION INTRA-ARTICULAR; INTRALESIONAL; INTRAMUSCULAR; SOFT TISSUE ONCE
Status: COMPLETED | OUTPATIENT
Start: 2023-01-17 | End: 2023-01-17

## 2023-01-17 RX ADMIN — METHYLPREDNISOLONE ACETATE 80 MG: 80 INJECTION, SUSPENSION INTRA-ARTICULAR; INTRALESIONAL; INTRAMUSCULAR; SOFT TISSUE at 11:39

## 2023-01-17 NOTE — PROGRESS NOTES
Name: Raeann Damon  YOB: 1965  Gender: female  MRN: [id number]          HPI: Raeann Damon is a 62 y.o. right-hand-dominant female seen for right knee problems. I am also seeing her for both shoulders. She has a history of hypertension thyroid issues and asthma. She has a long history of bilateral shoulder pain. She had injections into both shoulders. The left is currently bothering her more than the right. I saw her 16 years ago for the right shoulder. She did moved to [place] and then recently moved back. She was scheduled to have the left shoulder surgery in [place]. I injected her on her last visit. The left shoulder is doing better. Her right knee is bothering her as well. She is taking Mobic. She has a long history of right knee pain. She is had multiple injections into the right knee. The right knee bothers her. She has known osteoarthritis in the right knee      ROS/Meds/PSH/PMH/FH/SH: A ten system review of systems was performed and is negative other than what is in the HPI. Tobacco:  reports that she has never smoked. She has never used smokeless tobacco.  There were no vitals taken for this visit. Physical Examination:  She is an awake alert pleasant female ambulating without difficulty  She has a full range of cervical spine motion without radicular findings    The right shoulder has 0 to 180 degrees of active and 0 to 180 degrees passive forward elevation. Pain in the overhead position  Internal rotation is to T10. External rotation is to 40 degrees at the side. In the 90 degree abducted position 90 degrees of external and 90 degrees internal rotation  The AC joint is tender  SC joint is non-tender. Greater tuberosity is tender.   Positive bicipital stress test negative Lamont sign positive  Negative O'Briens sign  negative lift-off sign  Positive positive belly press sign  Negative bear huggers sign  negative drop sign  negative hornblower's sign  No posterior glenohumeral joint line tenderness.   No evident excessive external rotation  Rotator cuff strength is 5/5 with weakness  Positive external rotation stress test.   Positive empty can sign  There is no evident anterior or posterior apprehension with a negative sulcus sign.   No instability  negative external and internal Rotation lag sign  Neurovascularly intact.      The left shoulder has 0 to 180 degrees of active and 0 to 180 degrees passive forward elevation.   Pain in the overhead position   internal rotation is to T6.  External rotation is to 40 degrees at the side.   In the 90 degree abducted position 90 degrees of external and 90 degrees internal rotation  The AC joint is tender  SC joint is non-tender.   Greater tuberosity is tender.  Positive bicipital stress test negative Lamont sign  Negative O'Briens sign  negative lift-off sign  Negative belly press sign  Negative bear huggers sign  negative drop sign  negative hornblower's sign  No posterior glenohumeral joint line tenderness.   No evident excessive external rotation  Rotator cuff strength is 5/5 with weakness positive  Positive external rotation stress test.   Positive empty can sign  There is no evident anterior or posterior apprehension with a negative sulcus sign.   No instability  negative external and internal Rotation lag sign  Neurovascularly intact.    The left knee has a range of motion of 0 to 135 degrees  negative Lachman,  negative anterior drawer,   negative posterior drawer  negative pivot.   Good tibial step-off,   No varus or valgus laxity at 0 or 30 degrees.   Negative lateral joint line tenderness   negative lateral Pablo.   Negative medial joint line tenderness  negative medial Pablo.   No evidence of any posterolateral instability.   No patellofemoral pain.   Negative compression,   negative apprehension  no effusion.   Calves Are non-tender,  neurovascularly patient is intact.   Negative Homans.   MPFL is  non-tender. Patient Can fully extend the knee. Good quad tone  No erythema. Negative Dial test.    The right knee has a range of motion of 0 to 125 degrees  negative Lachman,  negative anterior drawer,   negative posterior drawer  negative pivot. Good tibial step-off,   No varus or valgus laxity at 0 or 30 degrees. Negative lateral joint line tenderness   negative lateral Pablo. Positive medial joint line tenderness  negative medial Pablo. No evidence of any posterolateral instability. Positive patellofemoral pain. Negative compression,   negative apprehension  no effusion. Calves Are non-tender,  neurovascularly patient is intact. Negative Homans. MPFL is non-tender. Patient Can fully extend the knee. Good quad tone  No erythema. Negative Dial test.              Data Reviewed:        XR: AP standing PA 45 degree weightbearing lateral and sunrise views both knees   Clinical Indication    ICD-10-CM    1. Primary osteoarthritis of right knee  M17.11 XR Knee Bilateral Standard     DRAIN/INJECT LARGE JOINT/BURSA     methylPREDNISolone acetate (DEPO-MEDROL) injection 80 mg      2. Nontraumatic complete tear of left rotator cuff  M75.122       3. Degenerative joint disease of left acromioclavicular joint  M19.012       4. Rotator cuff tear arthropathy of right shoulder  M75.101     M12.811       5. Degenerative joint disease of right acromioclavicular joint  M19.011          Report: AP standing PA 45 degree weightbearing lateral and sunrise views both knees demonstrate tricompartmental degenerative changes in the right knee most marked in the medial and patellofemoral compartments still with a preserved joint space    Impression: Osteoarthritis right knee   Norene Schirmer, MD        Multiple radiographs of the left shoulder from May 2022 demonstrates a type II acromion. [facility]. Radiographs of the right shoulder from 2020 demonstrate a type II acromion. Degenerative changes in the [facility] joint. MRI left shoulder from May 2022 demonstrates a type II acromion. Degenerative change in the [facility] joint. A full-thickness retracted supraspinatus rotator cuff tear to the level of the glenoid. Minimal atrophy. The joint is congruent. Articular cartilage is intact. MRI right shoulder from April 2021 demonstrates a type II acromion. Degenerative changes in the [facility] joint. High riding humeral head loss of acromiohumeral interval degenerative changes in the glenohumeral joint in combination with a full-thickness complex retracted rotator cuff tear to the level of the glenoid consistent with rotator cuff tear arthropathy. The tear extends into the subscapularis. There is atrophy. Minor Procedure:    OFFICE PROCEDURE PROGRESS NOTE    Chart reviewed for the following:   Bailey Thomas MD, have reviewed the History, Physical and updated the Allergic reactions for [address] performed immediately prior to start of procedure:   Bailey Thomas MD, have performed the following reviews on Delfina Freeman prior to the start of the procedure:            * Patient was identified by name and date of birth   * Agreement on procedure being performed was verified  * Risks and Benefits explained to the patient  * Procedure site verified and marked as necessary  * Patient was positioned for comfort     Time: 11:42 AM  Date of procedure: 1/17/2023  Procedure performed by:  Shelva Cabot, MD    After sterile prep and drape of the right knee, the patient received a 9:1 injection into the right knee. It consisted of 4.5 mL of 2% Xylocaine, 4.5 mL of 0.5% bupivacaine and 80 mg of Depo-Medrol. A sterile dressing was applied. The patient tolerated the procedure well. Impression:   1. Primary osteoarthritis of right knee    2. Nontraumatic complete tear of left rotator cuff    3. Degenerative joint disease of left acromioclavicular joint    4.  Rotator cuff tear arthropathy of right shoulder    5. Degenerative joint disease of right acromioclavicular joint       Rule out rotator cuff tear arthropathy left shoulder  Hypertension  Thyroid issues  Asthma    Plan:   I discussed the problem with the patient. I discussed nonoperative versus operative intervention including injections in regards to the right knee. In my opinion she is not a candidate for arthroscopic right knee intervention. She does not require a right total knee arthroplasty at this point. I discussed cortisone, viscosupplementation and PRP. We proceeded with a cortisone injection into the right knee. We will pre-CERT her for viscosupplementation. She is on Mobic and Voltaren gel. We will get her started on a knee rehab program.  I will reassess her progress back in 1 week for a left shoulder injection. 4.  Chronic illness with exacerbation    Follow up: Return in about 1 week (around 1/24/2023).              Chacho Noguera MD

## 2023-01-24 ENCOUNTER — OFFICE VISIT (OUTPATIENT)
Dept: ORTHOPEDIC SURGERY | Age: 58
End: 2023-01-24

## 2023-01-24 DIAGNOSIS — M75.122 NONTRAUMATIC COMPLETE TEAR OF LEFT ROTATOR CUFF: ICD-10-CM

## 2023-01-24 DIAGNOSIS — M19.011 DEGENERATIVE JOINT DISEASE OF RIGHT ACROMIOCLAVICULAR JOINT: ICD-10-CM

## 2023-01-24 DIAGNOSIS — M12.811 ROTATOR CUFF TEAR ARTHROPATHY OF RIGHT SHOULDER: ICD-10-CM

## 2023-01-24 DIAGNOSIS — M17.11 PRIMARY OSTEOARTHRITIS OF RIGHT KNEE: Primary | ICD-10-CM

## 2023-01-24 DIAGNOSIS — M19.012 DEGENERATIVE JOINT DISEASE OF LEFT ACROMIOCLAVICULAR JOINT: ICD-10-CM

## 2023-01-24 DIAGNOSIS — M75.101 ROTATOR CUFF TEAR ARTHROPATHY OF RIGHT SHOULDER: ICD-10-CM

## 2023-01-24 RX ORDER — AMLODIPINE BESYLATE 2.5 MG/1
2.5 TABLET ORAL DAILY
COMMUNITY

## 2023-01-24 RX ORDER — METHYLPREDNISOLONE ACETATE 80 MG/ML
80 INJECTION, SUSPENSION INTRA-ARTICULAR; INTRALESIONAL; INTRAMUSCULAR; SOFT TISSUE ONCE
Status: COMPLETED | OUTPATIENT
Start: 2023-01-24 | End: 2023-01-24

## 2023-01-24 RX ADMIN — METHYLPREDNISOLONE ACETATE 80 MG: 80 INJECTION, SUSPENSION INTRA-ARTICULAR; INTRALESIONAL; INTRAMUSCULAR; SOFT TISSUE at 10:12

## 2023-01-24 NOTE — PROGRESS NOTES
Name: Ramy Ho  YOB: 1965  Gender: female  MRN: [id number]          HPI: Ramy Ho is a 62 y.o. right-hand-dominant female seen for right knee problems and left shoulder problems. She returns for viscosupplementation in the right knee with Durolane and a left shoulder injection. ROS/Meds/PSH/PMH/FH/SH: A ten system review of systems was performed and is negative other than what is in the HPI. Tobacco:  reports that she has never smoked. She has never used smokeless tobacco.  There were no vitals taken for this visit. Physical Examination:  She is an awake alert pleasant female ambulating without difficulty  She has a full range of cervical spine motion without radicular findings    The right shoulder has 0 to 180 degrees of active and 0 to 180 degrees passive forward elevation. Pain in the overhead position  Internal rotation is to T10. External rotation is to 40 degrees at the side. In the 90 degree abducted position 90 degrees of external and 90 degrees internal rotation  The AC joint is tender  SC joint is non-tender. Greater tuberosity is tender. Positive bicipital stress test negative Lamont sign positive  Negative O'Briens sign  negative lift-off sign  Positive positive belly press sign  Negative bear huggers sign  negative drop sign  negative hornblower's sign  No posterior glenohumeral joint line tenderness. No evident excessive external rotation  Rotator cuff strength is 5/5 with weakness  Positive external rotation stress test.   Positive empty can sign  There is no evident anterior or posterior apprehension with a negative sulcus sign. No instability  negative external and internal Rotation lag sign  Neurovascularly intact. The left shoulder has 0 to 180 degrees of active and 0 to 180 degrees passive forward elevation. Pain in the overhead position   internal rotation is to T6. External rotation is to 40 degrees at the side.    In the 90 degree abducted position 90 degrees of external and 90 degrees internal rotation  The AC joint is tender  SC joint is non-tender. Greater tuberosity is tender. Positive bicipital stress test negative Lamont sign  Negative O'Briens sign  negative lift-off sign  Negative belly press sign  Negative bear huggers sign  negative drop sign  negative hornblower's sign  No posterior glenohumeral joint line tenderness. No evident excessive external rotation  Rotator cuff strength is 5/5 with weakness positive  Positive external rotation stress test.   Positive empty can sign  There is no evident anterior or posterior apprehension with a negative sulcus sign. No instability  negative external and internal Rotation lag sign  Neurovascularly intact. The left knee has a range of motion of 0 to 135 degrees  negative Lachman,  negative anterior drawer,   negative posterior drawer  negative pivot. Good tibial step-off,   No varus or valgus laxity at 0 or 30 degrees. Negative lateral joint line tenderness   negative lateral Pablo. Negative medial joint line tenderness  negative medial Pablo. No evidence of any posterolateral instability. No patellofemoral pain. Negative compression,   negative apprehension  no effusion. Calves Are non-tender,  neurovascularly patient is intact. Negative Homans. MPFL is non-tender. Patient Can fully extend the knee. Good quad tone  No erythema. Negative Dial test.    The right knee has a range of motion of 0 to 125 degrees  negative Lachman,  negative anterior drawer,   negative posterior drawer  negative pivot. Good tibial step-off,   No varus or valgus laxity at 0 or 30 degrees. Negative lateral joint line tenderness   negative lateral Pablo. Positive medial joint line tenderness  negative medial Pablo. No evidence of any posterolateral instability. Positive patellofemoral pain. Negative compression,   negative apprehension  no effusion.    Calves Are non-tender,  neurovascularly patient is intact. Negative Homans. MPFL is non-tender. Patient Can fully extend the knee. Good quad tone  No erythema. Negative Dial test.              Data Reviewed:               Multiple radiographs of the left shoulder from May 2022 demonstrates a type II acromion. Degenerative changes in the [facility] joint. Radiographs of the right shoulder from 2020 demonstrate a type II acromion. Degenerative changes in the [facility] joint. MRI left shoulder from May 2022 demonstrates a type II acromion. Degenerative change in the [facility] joint. A full-thickness retracted supraspinatus rotator cuff tear to the level of the glenoid. Minimal atrophy. The joint is congruent. Articular cartilage is intact. MRI right shoulder from April 2021 demonstrates a type II acromion. Degenerative changes in the [facility] joint. High riding humeral head loss of acromiohumeral interval degenerative changes in the glenohumeral joint in combination with a full-thickness complex retracted rotator cuff tear to the level of the glenoid consistent with rotator cuff tear arthropathy. The tear extends into the subscapularis. There is atrophy.          Minor Procedure:        OFFICE PROCEDURE PROGRESS NOTE    Chart reviewed for the following:   Trinidad Beckman MD, have reviewed the History, Physical and updated the Allergic reactions for [address] performed immediately prior to start of procedure:   Trinidad Beckman MD, have performed the following reviews on Darylene Croft prior to the start of the procedure:            * Patient was identified by name and date of birth   * Agreement on procedure being performed was verified  * Risks and Benefits explained to the patient  * Procedure site verified and marked as necessary  * Patient was positioned for comfort     Time: 10:00 AM   Date of procedure: 1/24/2023  Procedure performed by:  Rosilyn Mortimer, MD    Procedure: After sterile prep and drape the patient received Durolane visco-supplementation 60 mg in 3 cc into the right knee. The patient tolerated the procedure well. A sterile dressing was applied. OFFICE PROCEDURE PROGRESS NOTE    Chart reviewed for the following:   Marissa Salcedo MD, have reviewed the History, Physical and updated the Allergic reactions for [address] performed immediately prior to start of procedure:   Marissa Salcedo MD, have performed the following reviews on Truddie Flight prior to the start of the procedure:            * Patient was identified by name and date of birth   * Agreement on procedure being performed was verified  * Risks and Benefits explained to the patient  * Procedure site verified and marked as necessary  * Patient was positioned for comfort     Time: 10:00 AM  Date of procedure: 1/24/2023  Procedure performed by:  Jass Schneider MD    After sterile prep and drape of the left shoulder, the patient received a 9:1 injection into the subacromial space. It consisted of 4.5 mL of 2% Xylocaine, 4.5 mL of 0.5% bupivacaine and 80 mg of Depo-Medrol. A sterile dressing was applied. The patient tolerated the procedure well. Impression:   1. Nontraumatic complete tear of left rotator cuff    2. Degenerative joint disease of left acromioclavicular joint    3. Rotator cuff tear arthropathy of right shoulder    4. Degenerative joint disease of right acromioclavicular joint    5. Primary osteoarthritis of right knee       Status post Durolane viscosupplementation right knee 1/24/2023  Rule out rotator cuff tear arthropathy left shoulder  Hypertension  Thyroid issues  Asthma    Plan:   I will recheck her back in 6 weeks    Follow up: No follow-ups on file.              Jass Schneider MD

## 2023-03-20 ENCOUNTER — OFFICE VISIT (OUTPATIENT)
Dept: ORTHOPEDIC SURGERY | Age: 58
End: 2023-03-20
Payer: OTHER GOVERNMENT

## 2023-03-20 DIAGNOSIS — M75.122 NONTRAUMATIC COMPLETE TEAR OF LEFT ROTATOR CUFF: ICD-10-CM

## 2023-03-20 DIAGNOSIS — M75.101 ROTATOR CUFF TEAR ARTHROPATHY OF RIGHT SHOULDER: ICD-10-CM

## 2023-03-20 DIAGNOSIS — M19.011 DEGENERATIVE JOINT DISEASE OF RIGHT ACROMIOCLAVICULAR JOINT: ICD-10-CM

## 2023-03-20 DIAGNOSIS — M12.811 ROTATOR CUFF TEAR ARTHROPATHY OF RIGHT SHOULDER: ICD-10-CM

## 2023-03-20 DIAGNOSIS — M17.11 PRIMARY OSTEOARTHRITIS OF RIGHT KNEE: Primary | ICD-10-CM

## 2023-03-20 DIAGNOSIS — M19.012 DEGENERATIVE JOINT DISEASE OF LEFT ACROMIOCLAVICULAR JOINT: ICD-10-CM

## 2023-03-20 PROCEDURE — 99214 OFFICE O/P EST MOD 30 MIN: CPT | Performed by: ORTHOPAEDIC SURGERY

## 2023-03-20 RX ORDER — DICLOFENAC SODIUM 75 MG/1
75 TABLET, DELAYED RELEASE ORAL 2 TIMES DAILY
Qty: 180 TABLET | Refills: 0 | Status: SHIPPED | OUTPATIENT
Start: 2023-03-20 | End: 2023-06-18

## 2023-03-20 NOTE — PROGRESS NOTES
rotator cuff tear arthropathy left shoulder  Hypertension  Thyroid issues  Asthma    Plan:   I discussed the problem with the patient in detail. I discussed nonoperative versus operative intervention including injections regarding her right knee. The viscosupplementation did not help. She has a hiking trip planned in early May. We discussed potential cortisone injections and we will defer that for now. I will rearrange her medications. She will stop the Mobic. I will start her on Voltaren and Voltaren gel. I will recheck her back in 1 month at which time we will proceed with a cortisone injection into the right knee prior to her hiking trip. I will recheck her back in 1 month    4. Chronic illness with exacerbation      Follow up: Return in about 1 month (around 4/20/2023).              Violet Henry MD

## 2023-03-24 DIAGNOSIS — F41.8 DEPRESSION WITH ANXIETY: ICD-10-CM

## 2023-03-24 RX ORDER — BUPROPION HYDROCHLORIDE 150 MG/1
TABLET, EXTENDED RELEASE ORAL
Qty: 180 TABLET | Refills: 3 | OUTPATIENT
Start: 2023-03-24

## 2023-04-18 ENCOUNTER — OFFICE VISIT (OUTPATIENT)
Dept: ORTHOPEDIC SURGERY | Age: 58
End: 2023-04-18

## 2023-04-18 DIAGNOSIS — M12.811 ROTATOR CUFF TEAR ARTHROPATHY OF RIGHT SHOULDER: ICD-10-CM

## 2023-04-18 DIAGNOSIS — M17.11 PRIMARY OSTEOARTHRITIS OF RIGHT KNEE: Primary | ICD-10-CM

## 2023-04-18 DIAGNOSIS — M75.101 ROTATOR CUFF TEAR ARTHROPATHY OF RIGHT SHOULDER: ICD-10-CM

## 2023-04-18 DIAGNOSIS — M19.012 DEGENERATIVE JOINT DISEASE OF LEFT ACROMIOCLAVICULAR JOINT: ICD-10-CM

## 2023-04-18 DIAGNOSIS — M19.011 DEGENERATIVE JOINT DISEASE OF RIGHT ACROMIOCLAVICULAR JOINT: ICD-10-CM

## 2023-04-18 DIAGNOSIS — M75.122 NONTRAUMATIC COMPLETE TEAR OF LEFT ROTATOR CUFF: ICD-10-CM

## 2023-04-18 RX ORDER — METHYLPREDNISOLONE ACETATE 80 MG/ML
80 INJECTION, SUSPENSION INTRA-ARTICULAR; INTRALESIONAL; INTRAMUSCULAR; SOFT TISSUE ONCE
Status: COMPLETED | OUTPATIENT
Start: 2023-04-18 | End: 2023-04-18

## 2023-04-18 RX ORDER — MELOXICAM 15 MG/1
15 TABLET ORAL DAILY
Qty: 30 TABLET | Refills: 0 | Status: SHIPPED | OUTPATIENT
Start: 2023-04-18 | End: 2023-05-18

## 2023-04-18 RX ADMIN — METHYLPREDNISOLONE ACETATE 80 MG: 80 INJECTION, SUSPENSION INTRA-ARTICULAR; INTRALESIONAL; INTRAMUSCULAR; SOFT TISSUE at 16:57

## 2023-04-18 NOTE — PROGRESS NOTES
explained to the patient  * Procedure site verified and marked as necessary  * Patient was positioned for comfort     Time: 10:33 AM  Date of procedure: 4/18/2023  Procedure performed by:  Kiesha Centeno MD    After sterile prep and drape of the right knee , the patient received a 9:1 injection into the right knee  It consisted of 4.5 mL of 2% Xylocaine, 4.5 mL of 0.5% bupivacaine and 80 mg of Depo-Medrol. A sterile dressing was applied. The patient tolerated the procedure well. Impression:   1. Primary osteoarthritis of right knee    2. Nontraumatic complete tear of left rotator cuff    3. Degenerative joint disease of left acromioclavicular joint    4. Rotator cuff tear arthropathy of right shoulder    5. Degenerative joint disease of right acromioclavicular joint       Status post Durolane viscosupplementation right knee 1/24/2023  Rule out rotator cuff tear arthropathy left shoulder  Hypertension  Thyroid issues  Asthma    Plan:   I injected her right knee today with cortisone. I gave her a Mobic prescription. I will recheck her back in 6 weeks      Follow up: No follow-ups on file.              Kiesha Centeno MD

## 2023-05-30 ENCOUNTER — OFFICE VISIT (OUTPATIENT)
Dept: ORTHOPEDIC SURGERY | Age: 58
End: 2023-05-30

## 2023-05-30 DIAGNOSIS — M19.012 DEGENERATIVE JOINT DISEASE OF LEFT ACROMIOCLAVICULAR JOINT: ICD-10-CM

## 2023-05-30 DIAGNOSIS — M12.811 ROTATOR CUFF TEAR ARTHROPATHY OF RIGHT SHOULDER: ICD-10-CM

## 2023-05-30 DIAGNOSIS — M19.011 DEGENERATIVE JOINT DISEASE OF RIGHT ACROMIOCLAVICULAR JOINT: ICD-10-CM

## 2023-05-30 DIAGNOSIS — M75.122 NONTRAUMATIC COMPLETE TEAR OF LEFT ROTATOR CUFF: ICD-10-CM

## 2023-05-30 DIAGNOSIS — M17.11 PRIMARY OSTEOARTHRITIS OF RIGHT KNEE: Primary | ICD-10-CM

## 2023-05-30 DIAGNOSIS — M75.101 ROTATOR CUFF TEAR ARTHROPATHY OF RIGHT SHOULDER: ICD-10-CM

## 2023-05-30 NOTE — PROGRESS NOTES
Name: Sara Dickson  YOB: 1965  Gender: female  MRN: [id number]          HPI: Sara Dickson is a 62 y.o. right-hand-dominant female seen for right knee problems and left shoulder problems. She returns and notes that the left shoulder is doing better. The right knee is not. The Durolane viscosupplementation really did not do much. The cortisone injection April 18, 2023 helped more than Durolane. She is headed on a cruise. The right knee is bothering her again. She may want to proceed with a right total knee arthroplasty in the winter. Her shoulders are doing well      ROS/Meds/PSH/PMH/FH/SH: A ten system review of systems was performed and is negative other than what is in the HPI. Tobacco:  reports that she has never smoked. She has never used smokeless tobacco.  There were no vitals taken for this visit. Physical Examination:  She is an awake alert pleasant female ambulating without difficulty  She has a full range of cervical spine motion without radicular findings    The right shoulder has 0 to 180 degrees of active and 0 to 180 degrees passive forward elevation. Pain in the overhead position  Internal rotation is to T10. External rotation is to 40 degrees at the side. In the 90 degree abducted position 90 degrees of external and 90 degrees internal rotation  The AC joint is tender  SC joint is non-tender. Greater tuberosity is tender. Positive bicipital stress test negative Lamont sign positive  Negative O'Briens sign  negative lift-off sign  Positive positive belly press sign  Negative bear huggers sign  negative drop sign  negative hornblower's sign  No posterior glenohumeral joint line tenderness. No evident excessive external rotation  Rotator cuff strength is 5/5 with weakness  Positive external rotation stress test.   Positive empty can sign  There is no evident anterior or posterior apprehension with a negative sulcus sign.    No instability  negative external and

## 2023-07-18 DIAGNOSIS — M17.11 PRIMARY OSTEOARTHRITIS OF RIGHT KNEE: Primary | ICD-10-CM

## 2023-08-30 ENCOUNTER — OFFICE VISIT (OUTPATIENT)
Dept: ORTHOPEDIC SURGERY | Age: 58
End: 2023-08-30

## 2023-08-30 DIAGNOSIS — M17.11 PRIMARY OSTEOARTHRITIS OF RIGHT KNEE: Primary | ICD-10-CM

## 2023-08-30 RX ORDER — TRIAMCINOLONE ACETONIDE 40 MG/ML
40 INJECTION, SUSPENSION INTRA-ARTICULAR; INTRAMUSCULAR ONCE
Status: COMPLETED | OUTPATIENT
Start: 2023-08-30 | End: 2023-08-30

## 2023-08-30 RX ADMIN — TRIAMCINOLONE ACETONIDE 40 MG: 40 INJECTION, SUSPENSION INTRA-ARTICULAR; INTRAMUSCULAR at 11:06

## 2023-08-30 NOTE — PROGRESS NOTES
line.  Limb lengths are equal.  The gait is noted to be with a slight trendelenburg and antalgia. Straight leg test is negative. Quadriceps strength is good. Sensation is intact to light touch bilaterally. Their judgment and insight are normal.  They are oriented to time, place and person. Their memory is good and the mood and affect appropriate. X-RAY: Views of the right knee are reviewed. 4 views standing reveal joint space loss, eburnated bone, and osteophyte formation present. X-ray impression:  Advanced degenerative joint disease of right knee    IMPRESSION:    Diagnosis Orders   1. Primary osteoarthritis of right knee        . RECOMMENDATIONS:    Reviewed x-ray findings with the patient. Today we discussed conservative treatments such as NSAIDs and PT. To date these have not been effective for the patient. The concerns with functional limitations are increased and no longer responding to conservative measures. Due to deterioration in their quality of life the decision has been made to perform total knee replacement. We discussed specific risks associated with knee replacement. This includes a risk of infection, dislocation, or general medical risks of surgery such as stroke, heart attack, and blood clot. TKA - Today we also discussed knee replacement surgery, and implant selection which will be a robotic assisted total knee utilizing a [place] implant. They are aware of the 1% risk of infection. They were also informed of the possibility of stroke, heart attack and blood clot; any of which could result in further hospitalization or death. and Procedure Note: The right knee was prepped with alcohol and injected with 40 mg of Kenalog and 2 mL of 0.5 % marcaine. [employer] US unit with a variable frequency (6.0-15.0 MHz) linear transducer was used to visualize the retropatellar fat pad, patella, patellar tendon, tibia, and to ensure proper intra-articular placement of medication.   Injection

## 2023-10-05 DIAGNOSIS — M17.11 PRIMARY OSTEOARTHRITIS OF RIGHT KNEE: Primary | ICD-10-CM

## 2023-12-06 ENCOUNTER — OFFICE VISIT (OUTPATIENT)
Dept: ORTHOPEDIC SURGERY | Age: 58
End: 2023-12-06
Payer: OTHER GOVERNMENT

## 2023-12-06 DIAGNOSIS — M17.11 PRIMARY OSTEOARTHRITIS OF RIGHT KNEE: Primary | ICD-10-CM

## 2023-12-06 PROCEDURE — 20611 DRAIN/INJ JOINT/BURSA W/US: CPT | Performed by: PHYSICIAN ASSISTANT

## 2023-12-06 PROCEDURE — 99999 PR OFFICE/OUTPT VISIT,PROCEDURE ONLY: CPT | Performed by: PHYSICIAN ASSISTANT

## 2023-12-06 RX ORDER — TRIAMCINOLONE ACETONIDE 40 MG/ML
40 INJECTION, SUSPENSION INTRA-ARTICULAR; INTRAMUSCULAR ONCE
Status: COMPLETED | OUTPATIENT
Start: 2023-12-06 | End: 2023-12-06

## 2023-12-06 RX ADMIN — TRIAMCINOLONE ACETONIDE 40 MG: 40 INJECTION, SUSPENSION INTRA-ARTICULAR; INTRAMUSCULAR at 10:47

## 2023-12-06 NOTE — PROGRESS NOTES
Name: Wang Whitfield  YOB: 1965  Gender: female  MRN: [id number]      Current Outpatient Medications:     meloxicam (MOBIC) 15 MG tablet, Take 1 tablet by mouth daily, Disp: 30 tablet, Rfl: 0    diclofenac sodium (VOLTAREN) 1 % GEL, Apply 2 g topically 2 times daily as needed for Pain, Disp: 100 g, Rfl: 2    diclofenac (VOLTAREN) 75 MG EC tablet, Take 1 tablet by mouth 2 times daily, Disp: 180 tablet, Rfl: 0    amLODIPine (NORVASC) 2.5 MG tablet, Take 2.5 mg by mouth daily, Disp: , Rfl:     meloxicam (MOBIC) 15 MG tablet, Take 1 tablet by mouth daily, Disp: 30 tablet, Rfl: 3    albuterol sulfate HFA (PROVENTIL;VENTOLIN;PROAIR) 108 (90 Base) MCG/ACT inhaler, Inhale 2 puffs into the lungs every 4 hours as needed, Disp: , Rfl:     buPROPion (WELLBUTRIN SR) 150 MG extended release tablet, Take 1 tablet by mouth 2 times daily, Disp: , Rfl:     diclofenac sodium (VOLTAREN) 1 % GEL, Apply 4 g topically 4 times daily, Disp: , Rfl:     gabapentin (NEURONTIN) 300 MG capsule, Take 300 mg by mouth 3 times daily. , Disp: , Rfl:     loratadine (CLARITIN) 10 MG tablet, Take 10 mg by mouth daily, Disp: , Rfl:     meloxicam (MOBIC) 15 MG tablet, Take 15 mg by mouth daily, Disp: , Rfl:   Allergies   Allergen Reactions    Latex Hives     Blisters    Sulfa Antibiotics Itching and Swelling    Shellfish-Derived Products Swelling and Rash       CC: Right knee pain    Impression: Osteoarthritis the right knee    Procedure: Kenalog injection with US guidance    Radiology Report:  [facility] unit with a variable frequency (6.0-15.0 MHz) linear transducer was used to examine the intracondylar notch, retropatellar fat pad, patella tendon, patella, and tibia as well as to ensure adequate needle placement. Injection image was obtained and placed in the patient's permanent chart. Procedure Note: The right knee was prepped with alcohol.  Then, under GE ultrasound guidance, the right knee was injected with 2 mL of 0.5% Marcaine and 40 mg of

## 2024-03-08 ENCOUNTER — OFFICE VISIT (OUTPATIENT)
Dept: ORTHOPEDIC SURGERY | Age: 59
End: 2024-03-08

## 2024-03-08 DIAGNOSIS — M17.11 PRIMARY OSTEOARTHRITIS OF RIGHT KNEE: Primary | ICD-10-CM

## 2024-03-08 RX ORDER — TRIAMCINOLONE ACETONIDE 40 MG/ML
40 INJECTION, SUSPENSION INTRA-ARTICULAR; INTRAMUSCULAR ONCE
Status: COMPLETED | OUTPATIENT
Start: 2024-03-08 | End: 2024-03-08

## 2024-03-08 RX ADMIN — TRIAMCINOLONE ACETONIDE 40 MG: 40 INJECTION, SUSPENSION INTRA-ARTICULAR; INTRAMUSCULAR at 09:29

## 2024-03-08 NOTE — PROGRESS NOTES
Name: Iraida Montalvo  YOB: 1965  Gender: female  MRN: [id number]    CC:   Chief Complaint   Patient presents with    Follow-up     Right knee pain will xray today          DIAGNOSIS:   Encounter Diagnosis   Name Primary?    Primary osteoarthritis of right knee Yes        HPI:   The right knee pain has been present for several weeks and is becoming worse.  It hurts at night when sleeping.  The pain is located over the right knee.  It does hurt to walk and gets worse with increased distances.   The pain does not radiate down the leg.  Numbness and tingling are not noted.   Treatment so far has been injections which are becoming less effective.       Current Outpatient Medications:     meloxicam (MOBIC) 15 MG tablet, Take 1 tablet by mouth daily, Disp: 30 tablet, Rfl: 0    diclofenac sodium (VOLTAREN) 1 % GEL, Apply 2 g topically 2 times daily as needed for Pain, Disp: 100 g, Rfl: 2    diclofenac (VOLTAREN) 75 MG EC tablet, Take 1 tablet by mouth 2 times daily, Disp: 180 tablet, Rfl: 0    amLODIPine (NORVASC) 2.5 MG tablet, Take 2.5 mg by mouth daily, Disp: , Rfl:     meloxicam (MOBIC) 15 MG tablet, Take 1 tablet by mouth daily, Disp: 30 tablet, Rfl: 3    albuterol sulfate HFA (PROVENTIL;VENTOLIN;PROAIR) 108 (90 Base) MCG/ACT inhaler, Inhale 2 puffs into the lungs every 4 hours as needed, Disp: , Rfl:     buPROPion (WELLBUTRIN SR) 150 MG extended release tablet, Take 1 tablet by mouth 2 times daily, Disp: , Rfl:     diclofenac sodium (VOLTAREN) 1 % GEL, Apply 4 g topically 4 times daily, Disp: , Rfl:     gabapentin (NEURONTIN) 300 MG capsule, Take 300 mg by mouth 3 times daily., Disp: , Rfl:     loratadine (CLARITIN) 10 MG tablet, Take 10 mg by mouth daily, Disp: , Rfl:     meloxicam (MOBIC) 15 MG tablet, Take 15 mg by mouth daily, Disp: , Rfl:   Allergies   Allergen Reactions    Latex Hives     Blisters    Sulfa Antibiotics Itching and Swelling    Shellfish-Derived Products Swelling and Rash     No past

## 2024-06-28 ENCOUNTER — OFFICE VISIT (OUTPATIENT)
Dept: ORTHOPEDIC SURGERY | Age: 59
End: 2024-06-28
Payer: OTHER GOVERNMENT

## 2024-06-28 DIAGNOSIS — M17.11 PRIMARY OSTEOARTHRITIS OF RIGHT KNEE: Primary | ICD-10-CM

## 2024-06-28 PROCEDURE — 20611 DRAIN/INJ JOINT/BURSA W/US: CPT | Performed by: PHYSICIAN ASSISTANT

## 2024-06-28 RX ORDER — TRIAMCINOLONE ACETONIDE 40 MG/ML
40 INJECTION, SUSPENSION INTRA-ARTICULAR; INTRAMUSCULAR ONCE
Status: COMPLETED | OUTPATIENT
Start: 2024-06-28 | End: 2024-06-28

## 2024-06-28 RX ADMIN — TRIAMCINOLONE ACETONIDE 40 MG: 40 INJECTION, SUSPENSION INTRA-ARTICULAR; INTRAMUSCULAR at 09:14

## 2024-06-28 NOTE — PROGRESS NOTES
agreement including the patient and SAWYER John.  The right knee was prepped with alcohol. Then, under GE ultrasound guidance, the right knee was injected with 2 mL of 0.5% Marcaine and 40 mg of Kenalog. The patient tolerated the procedure without difficulty.    Disposition: She is scheduled for right TKA with Dr. Bear on November 12th.

## 2024-07-18 ENCOUNTER — TELEPHONE (OUTPATIENT)
Dept: ORTHOPEDIC SURGERY | Age: 59
End: 2024-07-18

## 2024-07-18 NOTE — TELEPHONE ENCOUNTER
Called and LVM for pt in regards to her [employer] message that SYLWIA is in sx today and tomorrow and will not be able to see her in office. Recommended she go to UC or ER to see someone for her issue and get X-rays.

## 2024-07-19 ENCOUNTER — TELEPHONE (OUTPATIENT)
Dept: ORTHOPEDIC SURGERY | Age: 59
End: 2024-07-19

## 2024-07-19 NOTE — TELEPHONE ENCOUNTER
Called and spoke to pt about rescheduling her appt from 1:30pm on 7/24 to 12:30pm on 7/24. Pt agreed. Appt rescheduled.

## 2024-07-24 ENCOUNTER — OFFICE VISIT (OUTPATIENT)
Dept: ORTHOPEDIC SURGERY | Age: 59
End: 2024-07-24
Payer: OTHER GOVERNMENT

## 2024-07-24 DIAGNOSIS — M19.011 DEGENERATIVE JOINT DISEASE OF RIGHT ACROMIOCLAVICULAR JOINT: ICD-10-CM

## 2024-07-24 DIAGNOSIS — M75.102 ROTATOR CUFF TEAR ARTHROPATHY, LEFT: ICD-10-CM

## 2024-07-24 DIAGNOSIS — S43.401A SPRAIN OF RIGHT SHOULDER, UNSPECIFIED SHOULDER SPRAIN TYPE, INITIAL ENCOUNTER: Primary | ICD-10-CM

## 2024-07-24 DIAGNOSIS — M12.812 ROTATOR CUFF TEAR ARTHROPATHY, LEFT: ICD-10-CM

## 2024-07-24 DIAGNOSIS — M19.012 DEGENERATIVE JOINT DISEASE OF LEFT ACROMIOCLAVICULAR JOINT: ICD-10-CM

## 2024-07-24 DIAGNOSIS — M75.101 ROTATOR CUFF TEAR ARTHROPATHY OF RIGHT SHOULDER: ICD-10-CM

## 2024-07-24 DIAGNOSIS — M17.11 PRIMARY OSTEOARTHRITIS OF RIGHT KNEE: ICD-10-CM

## 2024-07-24 DIAGNOSIS — M12.811 ROTATOR CUFF TEAR ARTHROPATHY OF RIGHT SHOULDER: ICD-10-CM

## 2024-07-24 PROCEDURE — 99214 OFFICE O/P EST MOD 30 MIN: CPT | Performed by: ORTHOPAEDIC SURGERY

## 2024-07-24 RX ORDER — DIAZEPAM 5 MG/1
TABLET ORAL
Qty: 2 TABLET | Refills: 0 | Status: SHIPPED | OUTPATIENT
Start: 2024-07-24 | End: 2024-07-25

## 2024-07-24 NOTE — PROGRESS NOTES
glenohumeral joint with a preserved joint space.  No fracture.  No dislocation    Impression: as above   CASSANDRA REYNAGA JR, MD              Multiple radiographs of the left shoulder from May 2022 demonstrates a type II acromion.  Degenerative changes in the AC joint.    Radiographs of the right shoulder from 2020 demonstrate a type II acromion.  Degenerative changes in the AC joint.    MRI left shoulder from May 2022 demonstrates a type II acromion.  Degenerative change in the AC joint.  A full-thickness retracted supraspinatus rotator cuff tear to the level of the glenoid.  Minimal atrophy.  The joint is congruent.  Articular cartilage is intact.      MRI right shoulder from April 2021 demonstrates a type II acromion.  Degenerative changes in the AC joint.  High riding humeral head loss of acromiohumeral interval degenerative changes in the glenohumeral joint in combination with a full-thickness complex retracted rotator cuff tear to the level of the glenoid consistent with rotator cuff tear arthropathy.  The tear extends into the subscapularis.  There is atrophy.         Minor Procedure:            Impression:   1. Sprain of right shoulder, unspecified shoulder sprain type, initial encounter    2. Rotator cuff tear arthropathy of right shoulder    3. Degenerative joint disease of right acromioclavicular joint    4. Rotator cuff tear arthropathy, left    5. Degenerative joint disease of left acromioclavicular joint    6. Primary osteoarthritis of right knee       Rule out internal derangement right shoulder specifically rule out rotator cuff tear arthropathy with pseudoparesis  Status post Durolane viscosupplementation right knee 1/24/2023  Rule out rotator cuff tear arthropathy left shoulder  Hypertension  Thyroid issues  Asthma    Plan:   I discussed the problem with the patient.  I discussed nonoperative versus operative intervention including injections.  Specifically today I discussed the potential need for a

## 2024-07-29 ENCOUNTER — OFFICE VISIT (OUTPATIENT)
Dept: ORTHOPEDIC SURGERY | Age: 59
End: 2024-07-29
Payer: OTHER GOVERNMENT

## 2024-07-29 DIAGNOSIS — M12.811 ROTATOR CUFF TEAR ARTHROPATHY OF RIGHT SHOULDER: ICD-10-CM

## 2024-07-29 DIAGNOSIS — M19.011 DEGENERATIVE JOINT DISEASE OF RIGHT ACROMIOCLAVICULAR JOINT: ICD-10-CM

## 2024-07-29 DIAGNOSIS — S46.111A RUPTURE LONG HEAD BICEPS TENDON, RIGHT, INITIAL ENCOUNTER: Primary | ICD-10-CM

## 2024-07-29 DIAGNOSIS — M75.101 ROTATOR CUFF TEAR ARTHROPATHY OF RIGHT SHOULDER: ICD-10-CM

## 2024-07-29 PROCEDURE — 99214 OFFICE O/P EST MOD 30 MIN: CPT | Performed by: ORTHOPAEDIC SURGERY

## 2024-07-29 PROCEDURE — 20610 DRAIN/INJ JOINT/BURSA W/O US: CPT | Performed by: ORTHOPAEDIC SURGERY

## 2024-07-29 RX ORDER — METHYLPREDNISOLONE ACETATE 80 MG/ML
80 INJECTION, SUSPENSION INTRA-ARTICULAR; INTRALESIONAL; INTRAMUSCULAR; SOFT TISSUE ONCE
Status: COMPLETED | OUTPATIENT
Start: 2024-07-29 | End: 2024-07-29

## 2024-07-29 RX ADMIN — METHYLPREDNISOLONE ACETATE 80 MG: 80 INJECTION, SUSPENSION INTRA-ARTICULAR; INTRALESIONAL; INTRAMUSCULAR; SOFT TISSUE at 16:47

## 2024-07-29 NOTE — PROGRESS NOTES
medial joint line tenderness  negative medial Pablo.   No evidence of any posterolateral instability.   No patellofemoral pain.   Negative compression,   negative apprehension  no effusion.   Calves Are non-tender,  neurovascularly patient is intact.   Negative Homans.   MPFL is non-tender.   Patient Can fully extend the knee.   Good quad tone  No erythema.   Negative Dial test.    The right knee has a range of motion of 0 to 125 degrees  Global right knee pain  negative Lachman,  negative anterior drawer,   negative posterior drawer  negative pivot.   Good tibial step-off,   No varus or valgus laxity at 0 or 30 degrees.   Negative lateral joint line tenderness   negative lateral Pablo.   Positive medial joint line tenderness  negative medial Pablo.   No evidence of any posterolateral instability.   Positive patellofemoral pain.   Negative compression,   negative apprehension  Trace effusion.   Calves Are non-tender,  neurovascularly patient is intact.   Negative Homans.   MPFL is non-tender.   Patient Can fully extend the knee.   Good quad tone  No erythema.   Negative Dial test.              Data Reviewed:      MRI right shoulder demonstrates a type II acromion.  Degenerative changes in the AC joint.  A massive retracted full-thickness rotator cuff tear to the level of the glenoid involving supra and infraspinatus as well as subscapularis with degenerative changes in the glenohumeral joint.  This is consistent with rotator cuff tear arthropathy..  There is a tear of the long head of the biceps tendon.      Multiple radiographs of the left shoulder from May 2022 demonstrates a type II acromion.  Degenerative changes in the AC joint.    Radiographs of the right shoulder from 2020 demonstrate a type II acromion.  Degenerative changes in the AC joint.    MRI left shoulder from May 2022 demonstrates a type II acromion.  Degenerative change in the AC joint.  A full-thickness retracted supraspinatus rotator cuff

## 2024-09-18 DIAGNOSIS — M17.11 PRIMARY OSTEOARTHRITIS OF RIGHT KNEE: Primary | ICD-10-CM

## 2024-10-09 ENCOUNTER — OFFICE VISIT (OUTPATIENT)
Dept: ORTHOPEDIC SURGERY | Age: 59
End: 2024-10-09
Payer: OTHER GOVERNMENT

## 2024-10-09 DIAGNOSIS — M17.11 PRIMARY OSTEOARTHRITIS OF RIGHT KNEE: Primary | ICD-10-CM

## 2024-10-09 PROCEDURE — 99214 OFFICE O/P EST MOD 30 MIN: CPT | Performed by: ORTHOPAEDIC SURGERY

## 2024-10-09 NOTE — PROGRESS NOTES
Name: Iraida Montalvo  YOB: 1965  Gender: female  MRN: [id number]    CC:   Chief Complaint   Patient presents with    Follow-up     Right knee increased pain  and had an injury -  surgery date 11/12 will update xray today          DIAGNOSIS:   Encounter Diagnosis   Name Primary?    Primary osteoarthritis of right knee Yes        HPI:   The pain has been present for months and is becoming worse.  It hurts at night when sleeping.  The pain is located over the knee.  It does hurt to walk and gets worse with increased distances.   The pain does not radiate down the leg.  Numbness and tingling are not noted.   Treatment so far has been injection therapies in the past      Current Outpatient Medications:     meloxicam (MOBIC) 15 MG tablet, Take 1 tablet by mouth daily, Disp: 30 tablet, Rfl: 0    diclofenac sodium (VOLTAREN) 1 % GEL, Apply 2 g topically 2 times daily as needed for Pain, Disp: 100 g, Rfl: 2    diclofenac (VOLTAREN) 75 MG EC tablet, Take 1 tablet by mouth 2 times daily, Disp: 180 tablet, Rfl: 0    amLODIPine (NORVASC) 2.5 MG tablet, Take 2.5 mg by mouth daily, Disp: , Rfl:     meloxicam (MOBIC) 15 MG tablet, Take 1 tablet by mouth daily, Disp: 30 tablet, Rfl: 3    albuterol sulfate HFA (PROVENTIL;VENTOLIN;PROAIR) 108 (90 Base) MCG/ACT inhaler, Inhale 2 puffs into the lungs every 4 hours as needed, Disp: , Rfl:     buPROPion (WELLBUTRIN SR) 150 MG extended release tablet, Take 1 tablet by mouth 2 times daily, Disp: , Rfl:     diclofenac sodium (VOLTAREN) 1 % GEL, Apply 4 g topically 4 times daily, Disp: , Rfl:     gabapentin (NEURONTIN) 300 MG capsule, Take 300 mg by mouth 3 times daily., Disp: , Rfl:     loratadine (CLARITIN) 10 MG tablet, Take 10 mg by mouth daily, Disp: , Rfl:     meloxicam (MOBIC) 15 MG tablet, Take 15 mg by mouth daily, Disp: , Rfl:   Allergies   Allergen Reactions    Latex Hives     Blisters    Sulfa Antibiotics Itching and Swelling    Shellfish-Derived Products Swelling and

## 2024-10-24 ENCOUNTER — TELEPHONE (OUTPATIENT)
Age: 59
End: 2024-10-24

## 2024-10-28 ENCOUNTER — HOSPITAL ENCOUNTER (OUTPATIENT)
Dept: REHABILITATION | Age: 59
Discharge: HOME | End: 2024-10-31
Payer: OTHER GOVERNMENT

## 2024-10-28 ENCOUNTER — PREP FOR PROCEDURE (OUTPATIENT)
Dept: ORTHOPEDIC SURGERY | Age: 59
End: 2024-10-28

## 2024-10-28 ENCOUNTER — HOSPITAL ENCOUNTER (OUTPATIENT)
Dept: SURGERY | Age: 59
Discharge: HOME | End: 2024-10-31
Payer: OTHER GOVERNMENT

## 2024-10-28 VITALS
HEART RATE: 85 BPM | DIASTOLIC BLOOD PRESSURE: 64 MMHG | RESPIRATION RATE: 16 BRPM | BODY MASS INDEX: 24.41 KG/M2 | TEMPERATURE: 97.6 F | SYSTOLIC BLOOD PRESSURE: 128 MMHG | OXYGEN SATURATION: 98 % | HEIGHT: 64 IN | WEIGHT: 143 LBS

## 2024-10-28 DIAGNOSIS — Z01.818 PREOP EXAMINATION: Primary | ICD-10-CM

## 2024-10-28 DIAGNOSIS — Z01.818 PREOP EXAMINATION: ICD-10-CM

## 2024-10-28 LAB
ALBUMIN SERPL-MCNC: 4.1 G/DL (ref 3.5–5)
ALBUMIN/GLOB SERPL: 1.5 (ref 1–1.9)
ALP SERPL-CCNC: 60 U/L (ref 35–104)
ALT SERPL-CCNC: 27 U/L (ref 8–45)
ANION GAP SERPL CALC-SCNC: 11 MMOL/L (ref 9–18)
APTT PPP: 28.3 SEC (ref 23.3–37.4)
AST SERPL-CCNC: 19 U/L (ref 15–37)
BASOPHILS # BLD: 0.1 K/UL (ref 0–0.2)
BASOPHILS NFR BLD: 1 % (ref 0–2)
BILIRUB SERPL-MCNC: <0.2 MG/DL (ref 0–1.2)
BUN SERPL-MCNC: 18 MG/DL (ref 6–23)
CALCIUM SERPL-MCNC: 9.7 MG/DL (ref 8.8–10.2)
CHLORIDE SERPL-SCNC: 97 MMOL/L (ref 98–107)
CO2 SERPL-SCNC: 28 MMOL/L (ref 20–28)
CREAT SERPL-MCNC: 0.96 MG/DL (ref 0.6–1.1)
DIFFERENTIAL METHOD BLD: ABNORMAL
EKG ATRIAL RATE: 84 BPM
EKG DIAGNOSIS: NORMAL
EKG P AXIS: 80 DEGREES
EKG P-R INTERVAL: 144 MS
EKG Q-T INTERVAL: 352 MS
EKG QRS DURATION: 70 MS
EKG QTC CALCULATION (BAZETT): 415 MS
EKG R AXIS: 100 DEGREES
EKG T AXIS: 5 DEGREES
EKG VENTRICULAR RATE: 84 BPM
EOSINOPHIL # BLD: 0.1 K/UL (ref 0–0.8)
EOSINOPHIL NFR BLD: 1 % (ref 0.5–7.8)
ERYTHROCYTE [DISTWIDTH] IN BLOOD BY AUTOMATED COUNT: 12.5 % (ref 11.9–14.6)
EST. AVERAGE GLUCOSE BLD GHB EST-MCNC: 119 MG/DL
GLOBULIN SER CALC-MCNC: 2.8 G/DL (ref 2.3–3.5)
GLUCOSE SERPL-MCNC: 98 MG/DL (ref 70–99)
HBA1C MFR BLD: 5.8 % (ref 0–5.6)
HCT VFR BLD AUTO: 50.7 % (ref 35.8–46.3)
HGB BLD-MCNC: 16.2 G/DL (ref 11.7–15.4)
IMM GRANULOCYTES # BLD AUTO: 0 K/UL (ref 0–0.5)
IMM GRANULOCYTES NFR BLD AUTO: 0 % (ref 0–5)
INR PPP: 0.9
LYMPHOCYTES # BLD: 3 K/UL (ref 0.5–4.6)
LYMPHOCYTES NFR BLD: 35 % (ref 13–44)
MCH RBC QN AUTO: 28.7 PG (ref 26.1–32.9)
MCHC RBC AUTO-ENTMCNC: 32 G/DL (ref 31.4–35)
MCV RBC AUTO: 89.9 FL (ref 82–102)
MONOCYTES # BLD: 0.8 K/UL (ref 0.1–1.3)
MONOCYTES NFR BLD: 9 % (ref 4–12)
MRSA DNA SPEC QL NAA+PROBE: NOT DETECTED
NEUTS SEG # BLD: 4.6 K/UL (ref 1.7–8.2)
NEUTS SEG NFR BLD: 54 % (ref 43–78)
NRBC # BLD: 0 K/UL (ref 0–0.2)
PLATELET # BLD AUTO: 365 K/UL (ref 150–450)
PMV BLD AUTO: 9.4 FL (ref 9.4–12.3)
POTASSIUM SERPL-SCNC: 4.9 MMOL/L (ref 3.5–5.1)
PROT SERPL-MCNC: 6.9 G/DL (ref 6.3–8.2)
PROTHROMBIN TIME: 12.3 SEC (ref 11.3–14.9)
RBC # BLD AUTO: 5.64 M/UL (ref 4.05–5.2)
S AUREUS CPE NOSE QL NAA+PROBE: NOT DETECTED
SODIUM SERPL-SCNC: 136 MMOL/L (ref 136–145)
WBC # BLD AUTO: 8.5 K/UL (ref 4.3–11.1)

## 2024-10-28 PROCEDURE — 83036 HEMOGLOBIN GLYCOSYLATED A1C: CPT

## 2024-10-28 PROCEDURE — 85025 COMPLETE CBC W/AUTO DIFF WBC: CPT

## 2024-10-28 PROCEDURE — 93005 ELECTROCARDIOGRAM TRACING: CPT | Performed by: ANESTHESIOLOGY

## 2024-10-28 PROCEDURE — 85610 PROTHROMBIN TIME: CPT

## 2024-10-28 PROCEDURE — 87641 MR-STAPH DNA AMP PROBE: CPT

## 2024-10-28 PROCEDURE — 94664 DEMO&/EVAL PT USE INHALER: CPT

## 2024-10-28 PROCEDURE — 94760 N-INVAS EAR/PLS OXIMETRY 1: CPT

## 2024-10-28 PROCEDURE — 80053 COMPREHEN METABOLIC PANEL: CPT

## 2024-10-28 PROCEDURE — 97161 PT EVAL LOW COMPLEX 20 MIN: CPT

## 2024-10-28 PROCEDURE — 85730 THROMBOPLASTIN TIME PARTIAL: CPT

## 2024-10-28 RX ORDER — OLMESARTAN MEDOXOMIL 40 MG/1
40 TABLET ORAL NIGHTLY
COMMUNITY

## 2024-10-28 RX ORDER — DESLORATADINE AND PSEUDOEPHEDRINE SULFATE 2.5; 12 MG/1; MG/1
1 TABLET, EXTENDED RELEASE ORAL 2 TIMES DAILY
COMMUNITY

## 2024-10-28 RX ORDER — SODIUM CHLORIDE 0.9 % (FLUSH) 0.9 %
5-40 SYRINGE (ML) INJECTION PRN
Status: CANCELLED | OUTPATIENT
Start: 2024-10-28

## 2024-10-28 RX ORDER — SODIUM CHLORIDE 9 MG/ML
INJECTION, SOLUTION INTRAVENOUS PRN
Status: CANCELLED | OUTPATIENT
Start: 2024-10-28

## 2024-10-28 RX ORDER — DIPHENHYDRAMINE HCL 25 MG
25 CAPSULE ORAL NIGHTLY PRN
COMMUNITY

## 2024-10-28 RX ORDER — CHLORHEXIDINE GLUCONATE 4 %
1 LIQUID (ML) TOPICAL NIGHTLY
COMMUNITY

## 2024-10-28 RX ORDER — THYROID 60 MG/1
60 TABLET ORAL DAILY
COMMUNITY

## 2024-10-28 RX ORDER — SODIUM CHLORIDE 0.9 % (FLUSH) 0.9 %
5-40 SYRINGE (ML) INJECTION EVERY 12 HOURS SCHEDULED
Status: CANCELLED | OUTPATIENT
Start: 2024-10-28

## 2024-10-28 RX ORDER — ACETAMINOPHEN 500 MG
500 TABLET ORAL EVERY 6 HOURS PRN
COMMUNITY

## 2024-10-28 ASSESSMENT — SLEEP AND FATIGUE QUESTIONNAIRES
HOW LIKELY ARE YOU TO NOD OFF OR FALL ASLEEP WHILE SITTING AND TALKING TO SOMEONE: WOULD NEVER DOZE
HOW LIKELY ARE YOU TO NOD OFF OR FALL ASLEEP WHILE SITTING INACTIVE IN A PUBLIC PLACE: HIGH CHANCE OF DOZING
HOW LIKELY ARE YOU TO NOD OFF OR FALL ASLEEP WHILE SITTING QUIETLY AFTER LUNCH WITHOUT ALCOHOL: MODERATE CHANCE OF DOZING
HOW LIKELY ARE YOU TO NOD OFF OR FALL ASLEEP WHEN YOU ARE A PASSENGER IN A CAR FOR AN HOUR WITHOUT A BREAK: MODERATE CHANCE OF DOZING
HOW LIKELY ARE YOU TO NOD OFF OR FALL ASLEEP WHILE LYING DOWN TO REST IN THE AFTERNOON WHEN CIRCUMSTANCES PERMIT: MODERATE CHANCE OF DOZING
HOW LIKELY ARE YOU TO NOD OFF OR FALL ASLEEP IN A CAR, WHILE STOPPED FOR A FEW MINUTES IN TRAFFIC: WOULD NEVER DOZE
ESS TOTAL SCORE: 14
HOW LIKELY ARE YOU TO NOD OFF OR FALL ASLEEP WHILE WATCHING TV: MODERATE CHANCE OF DOZING
HOW LIKELY ARE YOU TO NOD OFF OR FALL ASLEEP WHILE SITTING AND READING: HIGH CHANCE OF DOZING

## 2024-10-28 ASSESSMENT — KOOS JR
HOW SEVERE IS YOUR KNEE STIFFNESS AFTER FIRST WAKING IN MORNING: SEVERE
STANDING UPRIGHT: SEVERE
RISING FROM SITTING: MODERATE
GOING UP OR DOWN STAIRS: SEVERE
BENDING TO THE FLOOR TO PICK UP OBJECT: MODERATE
KOOS JR TOTAL INTERVAL SCORE: 39.625
TWISING OR PIVOTING ON KNEE: EXTREME
STRAIGHTENING KNEE FULLY: MODERATE

## 2024-10-28 ASSESSMENT — PAIN DESCRIPTION - LOCATION: LOCATION: KNEE

## 2024-10-28 ASSESSMENT — PAIN SCALES - GENERAL: PAINLEVEL_OUTOF10: 0

## 2024-10-28 NOTE — PERIOP NOTE
Patient verified name and .    Order for consent found in EHR and matches case posting; patient verified.     Type 3 surgery, joint assessment complete.    Labs per surgeon: CBC,CMP, PT/IPTT, A1C; results pending.   Labs per anesthesia protocol: no additional  EKG: Completed today; results within anesthesia guidelines.     MRSA/MSSA swab collected per policy. MD to consult pharmacy to dose Vanc if appropriate.     Hospital approved surgical skin cleanser and instructions to return bottle on DOS given per hospital policy.    Patient provided with handouts including Guide to Surgery, Pain Management, Preventing Surgical Site Infections, and [facility] Brochure.    Patient answered medical/surgical history questions at their best of ability. All prior to admission medications documented in [facility]. Original medication prescription bottle not visualized during patient appointment.     Patient instructed to hold all vitamins 3 weeks prior to surgery and NSAIDS 21 days prior to surgery.     **Please drink 32 ounces of non-caffeinated clear liquids, on the day of surgery, 2 hours prior to your arrival time to avoid dehydration.     Patient teach back successful and patient demonstrates knowledge of instruction.

## 2024-10-28 NOTE — PROGRESS NOTES
10/28/24 1030   Breath Sounds   Breath Sounds Bilateral Clear   Oxygen Therapy/Pulse Ox   O2 Therapy Room air   Pulse 85   SpO2 98 %   Pulse Oximeter Device Mode Intermittent   $Pulse Oximeter $Spot check (single)   RT Misc Charges   $RT Education $HHN/MDI/ELISABETH Demo or Daniel  (SPACER)     Initial respiratory Assessment completed with pt. Pt was interviewed and evaluated in [facility] prior to surgery.  Patient ID:  Iraida Montalvo  [id number]  59 y.o.  1965  Surgeon: Dr. Bear  Date of Surgery: [unfilled]11//12/2024  Procedure: Total Right Knee Arthroplasty  Primary Care Physician: Arden Muñoz (Inactive) None  Specialists:    Pt taught proper COUGH technique  IS REVIEWED WITH PT AS WELL AS BENEFITS OF USING IS IN SEDENTARY PTS.  DIAPHRAGMATIC BREATHING EXERCISE INSTRUCTIONS GIVEN    History of smoking:   DENIES                 Quit date:         Secondhand smoke:FATHER    Past procedures with Oxygen desaturation or delayed awakening:DELAYED AWAKENING     Respiratory history:DENIES SOB                              HX OF ATHMA   SWINE FLU 2007- [NA  HX O FP NA 3 TIMES- WITH \"COLLAPSED LUNG\" PER PT                                  Respiratory meds: PT uses MDI PRN with PROAIR.   MDI instructions given verbally & written along with spacer.  Pt to use home MDI's morning of surgery & bring to Hospital.    FAMILY PRESENT:              PAST SLEEP STUDY:                 DENIES  HX OF EVERT:                                         DENIES  EVERT assessment:     DANGERS OF UNTREATED EVERT EXPLAINED TO PT.                                          SLEEPS ON SIDE        PHYSICAL EXAM   Body mass index is 24.55 kg/m².   Vitals:    10/28/24 1030   BP:    Pulse: (P) 85   Resp:    Temp:    SpO2: (P) 98%     Neck circumference:  35.5    cm    Loud snoring:                                                 YES             Witnessed apnea or wakening gasping or choking:        DENIES        Awakens with headaches:

## 2024-10-28 NOTE — PERIOP NOTE
The below lab results are within anesthesia guidelines. HgbA1c results pending--charge nurse to f/u. Labs automatically routed to ordering provider via Epic documentation.       Latest Reference Range & Units 10/28/24 09:49   Sodium 136 - 145 mmol/L 136   Potassium 3.5 - 5.1 mmol/L 4.9   Chloride 98 - 107 mmol/L 97 (L)   CARBON DIOXIDE 20 - 28 mmol/L 28   BUN,BUNPL 6 - 23 MG/DL 18   Creatinine 0.60 - 1.10 MG/DL 0.96   Anion Gap 9 - 18 mmol/L 11   Est, Glom Filt Rate >60 ml/min/1.73m2 68   Glucose 70 - 99 mg/dL 98   Calcium 8.8 - 10.2 MG/DL 9.7   Albumin/Globulin Ratio 1.0 - 1.9   1.5   Total Protein 6.3 - 8.2 g/dL 6.9   Albumin 3.5 - 5.0 g/dL 4.1   Globulin 2.3 - 3.5 g/dL 2.8   Alkaline Phosphatase 35 - 104 U/L 60   ALT 8 - 45 U/L 27   AST 15 - 37 U/L 19   Total Bilirubin 0.0 - 1.2 MG/DL <0.2   WBC 4.3 - 11.1 K/uL 8.5   RBC 4.05 - 5.2 M/uL 5.64 (H)   Hemoglobin Quant 11.7 - 15.4 g/dL 16.2 (H)   Hematocrit 35.8 - 46.3 % 50.7 (H)   MCV 82.0 - 102.0 FL 89.9   MCH 26.1 - 32.9 PG 28.7   MCHC 31.4 - 35.0 g/dL 32.0   MPV 9.4 - 12.3 FL 9.4   RDW 11.9 - 14.6 % 12.5   Platelet Count 150 - 450 K/uL 365   Neutrophils % 43 - 78 % 54   Lymphocyte % 13 - 44 % 35   Monocytes % 4.0 - 12.0 % 9   Eosinophils % 0.5 - 7.8 % 1   Basophils % 0.0 - 2.0 % 1   Neutrophils Absolute 1.7 - 8.2 K/UL 4.6   Lymphocytes Absolute 0.5 - 4.6 K/UL 3.0   Monocytes Absolute 0.1 - 1.3 K/UL 0.8   Eosinophils Absolute 0.0 - 0.8 K/UL 0.1   Basophils Absolute 0.0 - 0.2 K/UL 0.1   Differential Type -   AUTOMATED   Immature Granulocytes % 0.0 - 5.0 % 0   Nucleated Red Blood Cells 0.0 - 0.2 K/uL 0.00   Immature Granulocytes Absolute 0.0 - 0.5 K/UL 0.0   Prothrombin Time 11.3 - 14.9 sec 12.3   INR -   0.9   aPTT 23.3 - 37.4 SEC 28.3   MRSA/STAPH AUREUS DNA  Rpt (IP)   (L): Data is abnormally low  (H): Data is abnormally high  (IP): In Process  Rpt: View report in Results Review for more information

## 2024-10-28 NOTE — PERIOP NOTE
PLEASE CONTINUE TAKING ALL PRESCRIPTION MEDICATIONS UP TO THE DAY OF SURGERY UNLESS OTHERWISE DIRECTED BELOW.     DISCONTINUE all over the counter vitamins, supplements, and herbals 21 days prior to surgery. DISCONTINUE Non-Steroidal Anti-Inflammatory (NSAIDS) such as Advil, Ibuprofen, Motrin, Naproxen, and Aleve 21 days prior to surgery.      *IT IS OK TO TAKE TYLENOL, Allergy medication, and indigestion medications*     Prescription medications to HOLD     Hold Diclofenac gel 21 days prior to surgery    Hold Meloxicam 21 days prior to surgeyr           CONTINUE all other prescriptions like normal up until the day of surgery--TAKE ONLY THE BELOW MEDICATIONS THE DAY OF SURGERY.    Amlodipine             Wellbutrin    Gabapentin            Albuterol inhaler if needed    [place] Thyroid       Comments   The day before surgery please take 2 Tylenol in the morning and then again before bed. You may use either regular or extra strength.    Bring inhaler and incentive spirometer back to the hospital.    **Please drink 32 ounces of non-caffeinated clear liquids, on the day of surgery, 2 hours prior to your arrival time to avoid dehydration.       Please do not bring home medications with you on the day of surgery unless otherwise directed by your nurse.  If you are instructed to bring home medications, please give them to your nurse as they will be administered by the nursing staff.     If you have any questions, please call [facility] [phone].     A copy of this note was provided to the patient for reference.

## 2024-10-28 NOTE — PROGRESS NOTES
10/28/24 1030   [place] Sleepiness Scale   Sitting and reading 3   Watching TV 2   Sitting, inactive in a public place (e.g. a theatre or a meeting) 3   As a passenger in a car for an hour without a break 2   Lying down to rest in the afternoon when circumstances permit 2   Sitting and talking to someone 0   Sitting quietly after a lunch without alcohol 2   In a car, while stopped for a few minutes in traffic 0   [place] Sleepiness Score 14   Neck Circumference   Neck (Inches) 14     Pt's symptoms include:    Snoring  Tiredness- excessive daytime sleepiness  HX OF DELAYED AWAKENING  HTN  GERD  Neck size      35.5        cm  Modified William stage 4  SACS Score 6  STOP BANG 4  Height    5  '   4 \"   Weight  143   lbs  BMI 24.55      Refer patient for sleep study based on above assessment.  [facility] [[email or web address]]  Phone number:  [phone]

## 2024-10-28 NOTE — PROGRESS NOTES
Iraida Montalvo  : 1965  Primary:  [facility]  Secondary:  [facility] [place] [address]  [phone]      Physical Therapy Prehab Evaluation Summary:10/28/2024   Time In/Out   PT Charge Capture  Episode     MEDICAL/REFERRING DIAGNOSIS: Unilateral primary osteoarthritis, right knee [M17.11]  REFERRING PHYSICIAN: Gokul Bear,*    Treatment Diagnosis:   Pain in Right Knee (M25.561)  Stiffness of Right Knee, Not elsewhere classified (M25.661)  Difficulty in walking, Not elsewhere classified (R26.2)    DATE OF SURGERY: 24  Assessment:   COMMENTS:  Ms. Montalvo is present for a Prehab Physical Therapy Assessment for their upcoming right TKA. They are here with  . After discussing the surgical admission options and discharge plans, they are planning on discharging on day of surgery.    She has a rollator, may have a RW.  She is a cyclist.     PROBLEM LIST:   (Impacting functional limitations):  Ms. Montalvo presents with the following lower extremity(s) problems:  Strength  Range of Motion  Home Exercise Program  Pain INTERVENTIONS PLANNED:   (Benefits and precautions of physical therapy have been discussed with the patient.)  Home Exercise Program  Educational Discussion       GOALS: (Goals have been discussed and agreed upon with patient.)  Discharge Goals: Time Frame: 1 Day  Patient will demonstrate independence with a home exercise program designed to increase strength, range of motion, and pain control to minimize functional deficits and optimize patient for total joint replacement.    Subjective:   Past Medical History/Comorbidities:   Ms. Montalvo  has a past medical history of Anxiety, Asthma, History of recurrent UTI (urinary tract infection), Hypertension, Hypothyroidism, Osteoarthritis, and Prolonged emergence from general anesthesia.  Ms. Montalvo  has a past surgical history that includes Cholecystectomy, laparoscopic;

## 2024-10-29 NOTE — CARE COORDINATION
[facility] Case Management note:  Patient screened in Prehab for discharge planning needs. Patient scheduled for a future total joint replacement.  We discussed Home Health and equipment needed after surgery. List of Home Health providers offered.  Patient w/o preference towards provider.  Pt lives in [facility].  We will arrange Home health on the day of surgery. She plans to borrow a walker and RTS.

## 2024-10-31 ENCOUNTER — OFFICE VISIT (OUTPATIENT)
Dept: ORTHOPEDIC SURGERY | Age: 59
End: 2024-10-31

## 2024-10-31 DIAGNOSIS — M17.11 OSTEOARTHRITIS OF RIGHT KNEE, UNSPECIFIED OSTEOARTHRITIS TYPE: Primary | ICD-10-CM

## 2024-10-31 RX ORDER — ONDANSETRON 4 MG/1
4 TABLET, FILM COATED ORAL EVERY 8 HOURS PRN
Qty: 20 TABLET | Refills: 0 | Status: SHIPPED | OUTPATIENT
Start: 2024-10-31

## 2024-10-31 RX ORDER — ASPIRIN 81 MG/1
81 TABLET ORAL 2 TIMES DAILY
Qty: 70 TABLET | Refills: 0 | Status: SHIPPED | OUTPATIENT
Start: 2024-10-31 | End: 2024-12-05

## 2024-10-31 RX ORDER — METHOCARBAMOL 750 MG/1
750 TABLET, FILM COATED ORAL 4 TIMES DAILY PRN
Qty: 30 TABLET | Refills: 0 | Status: SHIPPED | OUTPATIENT
Start: 2024-10-31 | End: 2024-11-10

## 2024-10-31 RX ORDER — OXYCODONE HYDROCHLORIDE 5 MG/1
5-10 TABLET ORAL EVERY 4 HOURS PRN
Qty: 60 TABLET | Refills: 0 | Status: SHIPPED | OUTPATIENT
Start: 2024-10-31 | End: 2024-11-05

## 2024-10-31 NOTE — PROGRESS NOTES
pain    History:  Patient returns today for pre-op exam prior to Right TKA.  Postop scripts have been e-scribed to patient's pharmacy.  See formal H&P in Epic chart.

## 2024-10-31 NOTE — H&P
[facility]  History and Physical Exam    Patient ID:  Iraida Montalvo  [id number]    59 y.o.  1965    Today: October 31, 2024    Vitals Signs: Reviewed as noted in medical record.    Allergies:   Allergies   Allergen Reactions    Latex Hives     Blisters    Nickel     Sulfa Antibiotics Itching and Swelling    Lortab [Hydrocodone-Acetaminophen] Itching, Nausea And Vomiting and Anxiety    Shellfish-Derived Products Swelling and Rash       CC: Right knee pain    HPI:  Pt complains of right knee pain and difficulty ambulating.     Relevant PMH:   Past Medical History:   Diagnosis Date    Anxiety     managed with medication    Asthma     secondary allergies--PRN inhaler, last used 10/13/24    History of recurrent UTI (urinary tract infection)     Hypertension     managed with medication    Hypothyroidism     managed with medication    Osteoarthritis     Prolonged emergence from general anesthesia        Objective:                    HEENT: NC/AT                   Lungs:  clear                   Heart:   rrr                   Abdomen: soft                   Extremities:  Pain with rom of the right knee joint    Radiographs: reveal right knee osteoarthritis with loss of joint space and bone spurs.    Assessment: Osteoarthritis of right knee [M17.11]    Plan:  Proceed with scheduled Procedure(s) (LRB):  KNEE TOTAL ARTHROPLASTY ROBOTIC-right (Right) .  The patient has failed conservative treatment including NSAIDS, and injections.  Total joint replacement surgery and associated risks and benefits have been discussed with the patient along with implant selection including but not limited to Danny and DePuy total joint systems.  Due to the amount of pain the patient is experiencing we will proceed with the scheduled procedure.  Will plan for same day discharge.    Signed By: CODY Jaimes  October 31, 2024

## 2024-11-11 ENCOUNTER — ANESTHESIA EVENT (OUTPATIENT)
Dept: SURGERY | Age: 59
End: 2024-11-11
Payer: OTHER GOVERNMENT

## 2024-11-12 ENCOUNTER — HOSPITAL ENCOUNTER (OUTPATIENT)
Age: 59
Discharge: HOME HEALTH | End: 2024-11-12
Attending: ORTHOPAEDIC SURGERY | Admitting: ORTHOPAEDIC SURGERY
Payer: OTHER GOVERNMENT

## 2024-11-12 ENCOUNTER — ANESTHESIA (OUTPATIENT)
Dept: SURGERY | Age: 59
End: 2024-11-12
Payer: OTHER GOVERNMENT

## 2024-11-12 VITALS
WEIGHT: 143.2 LBS | RESPIRATION RATE: 16 BRPM | TEMPERATURE: 98.1 F | BODY MASS INDEX: 24.45 KG/M2 | OXYGEN SATURATION: 96 % | HEART RATE: 76 BPM | SYSTOLIC BLOOD PRESSURE: 126 MMHG | DIASTOLIC BLOOD PRESSURE: 67 MMHG | HEIGHT: 64 IN

## 2024-11-12 DIAGNOSIS — M17.11 OSTEOARTHRITIS OF RIGHT KNEE, UNSPECIFIED OSTEOARTHRITIS TYPE: Primary | ICD-10-CM

## 2024-11-12 PROCEDURE — 97535 SELF CARE MNGMENT TRAINING: CPT

## 2024-11-12 PROCEDURE — 3600000005 HC SURGERY LEVEL 5 BASE: Performed by: ORTHOPAEDIC SURGERY

## 2024-11-12 PROCEDURE — 94760 N-INVAS EAR/PLS OXIMETRY 1: CPT

## 2024-11-12 PROCEDURE — 97165 OT EVAL LOW COMPLEX 30 MIN: CPT

## 2024-11-12 PROCEDURE — 6360000002 HC RX W HCPCS: Performed by: NURSE ANESTHETIST, CERTIFIED REGISTERED

## 2024-11-12 PROCEDURE — 6360000002 HC RX W HCPCS: Performed by: PHYSICIAN ASSISTANT

## 2024-11-12 PROCEDURE — 3700000000 HC ANESTHESIA ATTENDED CARE: Performed by: ORTHOPAEDIC SURGERY

## 2024-11-12 PROCEDURE — 2709999900 HC NON-CHARGEABLE SUPPLY: Performed by: ORTHOPAEDIC SURGERY

## 2024-11-12 PROCEDURE — 3600000015 HC SURGERY LEVEL 5 ADDTL 15MIN: Performed by: ORTHOPAEDIC SURGERY

## 2024-11-12 PROCEDURE — 2580000003 HC RX 258: Performed by: ANESTHESIOLOGY

## 2024-11-12 PROCEDURE — C1713 ANCHOR/SCREW BN/BN,TIS/BN: HCPCS | Performed by: ORTHOPAEDIC SURGERY

## 2024-11-12 PROCEDURE — 6370000000 HC RX 637 (ALT 250 FOR IP): Performed by: PHYSICIAN ASSISTANT

## 2024-11-12 PROCEDURE — 2720000010 HC SURG SUPPLY STERILE: Performed by: ORTHOPAEDIC SURGERY

## 2024-11-12 PROCEDURE — 97161 PT EVAL LOW COMPLEX 20 MIN: CPT

## 2024-11-12 PROCEDURE — 6360000002 HC RX W HCPCS: Performed by: ORTHOPAEDIC SURGERY

## 2024-11-12 PROCEDURE — 7100000001 HC PACU RECOVERY - ADDTL 15 MIN: Performed by: ORTHOPAEDIC SURGERY

## 2024-11-12 PROCEDURE — 2580000003 HC RX 258: Performed by: ORTHOPAEDIC SURGERY

## 2024-11-12 PROCEDURE — 6360000002 HC RX W HCPCS: Performed by: ANESTHESIOLOGY

## 2024-11-12 PROCEDURE — 2500000003 HC RX 250 WO HCPCS: Performed by: ORTHOPAEDIC SURGERY

## 2024-11-12 PROCEDURE — C1776 JOINT DEVICE (IMPLANTABLE): HCPCS | Performed by: ORTHOPAEDIC SURGERY

## 2024-11-12 PROCEDURE — 97530 THERAPEUTIC ACTIVITIES: CPT

## 2024-11-12 PROCEDURE — 64447 NJX AA&/STRD FEMORAL NRV IMG: CPT | Performed by: ANESTHESIOLOGY

## 2024-11-12 PROCEDURE — 2580000003 HC RX 258: Performed by: PHYSICIAN ASSISTANT

## 2024-11-12 PROCEDURE — 7100000000 HC PACU RECOVERY - FIRST 15 MIN: Performed by: ORTHOPAEDIC SURGERY

## 2024-11-12 PROCEDURE — 3700000001 HC ADD 15 MINUTES (ANESTHESIA): Performed by: ORTHOPAEDIC SURGERY

## 2024-11-12 PROCEDURE — 2500000003 HC RX 250 WO HCPCS: Performed by: NURSE ANESTHETIST, CERTIFIED REGISTERED

## 2024-11-12 DEVICE — TIBIAL BEARING INSERT - CS
Type: IMPLANTABLE DEVICE | Site: KNEE | Status: FUNCTIONAL
Brand: TRIATHLON

## 2024-11-12 DEVICE — COMPONENT TOT KNEE CAPPED PRIMARY K2STRYKER] STRYKER CORP]: Type: IMPLANTABLE DEVICE | Status: FUNCTIONAL

## 2024-11-12 DEVICE — TIBIAL COMPONENT
Type: IMPLANTABLE DEVICE | Site: KNEE | Status: FUNCTIONAL
Brand: TRIATHLON

## 2024-11-12 DEVICE — CRUCIATE RETAINING FEMORAL
Type: IMPLANTABLE DEVICE | Site: KNEE | Status: FUNCTIONAL
Brand: TRIATHLON

## 2024-11-12 RX ORDER — SENNA AND DOCUSATE SODIUM 50; 8.6 MG/1; MG/1
1 TABLET, FILM COATED ORAL 2 TIMES DAILY
Status: DISCONTINUED | OUTPATIENT
Start: 2024-11-12 | End: 2024-11-12 | Stop reason: HOSPADM

## 2024-11-12 RX ORDER — OXYCODONE HYDROCHLORIDE 5 MG/1
10 TABLET ORAL EVERY 4 HOURS PRN
Status: DISCONTINUED | OUTPATIENT
Start: 2024-11-12 | End: 2024-11-12 | Stop reason: HOSPADM

## 2024-11-12 RX ORDER — LOSARTAN POTASSIUM 50 MG/1
100 TABLET ORAL DAILY
Status: DISCONTINUED | OUTPATIENT
Start: 2024-11-13 | End: 2024-11-12 | Stop reason: HOSPADM

## 2024-11-12 RX ORDER — TRANEXAMIC ACID 100 MG/ML
INJECTION, SOLUTION INTRAVENOUS
Status: DISCONTINUED | OUTPATIENT
Start: 2024-11-12 | End: 2024-11-12 | Stop reason: SDUPTHER

## 2024-11-12 RX ORDER — HYDROMORPHONE HYDROCHLORIDE 1 MG/ML
0.5 INJECTION, SOLUTION INTRAMUSCULAR; INTRAVENOUS; SUBCUTANEOUS
Status: DISCONTINUED | OUTPATIENT
Start: 2024-11-12 | End: 2024-11-12 | Stop reason: HOSPADM

## 2024-11-12 RX ORDER — KETOROLAC TROMETHAMINE 30 MG/ML
INJECTION, SOLUTION INTRAMUSCULAR; INTRAVENOUS PRN
Status: DISCONTINUED | OUTPATIENT
Start: 2024-11-12 | End: 2024-11-12 | Stop reason: ALTCHOICE

## 2024-11-12 RX ORDER — SODIUM CHLORIDE 9 MG/ML
INJECTION, SOLUTION INTRAVENOUS CONTINUOUS
Status: DISCONTINUED | OUTPATIENT
Start: 2024-11-12 | End: 2024-11-12 | Stop reason: HOSPADM

## 2024-11-12 RX ORDER — FENTANYL CITRATE 50 UG/ML
100 INJECTION, SOLUTION INTRAMUSCULAR; INTRAVENOUS
Status: COMPLETED | OUTPATIENT
Start: 2024-11-12 | End: 2024-11-12

## 2024-11-12 RX ORDER — LIDOCAINE HYDROCHLORIDE 20 MG/ML
INJECTION, SOLUTION EPIDURAL; INFILTRATION; INTRACAUDAL; PERINEURAL
Status: DISCONTINUED | OUTPATIENT
Start: 2024-11-12 | End: 2024-11-12 | Stop reason: SDUPTHER

## 2024-11-12 RX ORDER — NALOXONE HYDROCHLORIDE 0.4 MG/ML
INJECTION, SOLUTION INTRAMUSCULAR; INTRAVENOUS; SUBCUTANEOUS PRN
Status: DISCONTINUED | OUTPATIENT
Start: 2024-11-12 | End: 2024-11-12 | Stop reason: HOSPADM

## 2024-11-12 RX ORDER — AMLODIPINE BESYLATE 5 MG/1
2.5 TABLET ORAL DAILY
Status: DISCONTINUED | OUTPATIENT
Start: 2024-11-13 | End: 2024-11-12 | Stop reason: HOSPADM

## 2024-11-12 RX ORDER — ASPIRIN 81 MG/1
81 TABLET ORAL 2 TIMES DAILY
Status: DISCONTINUED | OUTPATIENT
Start: 2024-11-12 | End: 2024-11-12 | Stop reason: HOSPADM

## 2024-11-12 RX ORDER — PROCHLORPERAZINE EDISYLATE 5 MG/ML
5 INJECTION INTRAMUSCULAR; INTRAVENOUS
Status: DISCONTINUED | OUTPATIENT
Start: 2024-11-12 | End: 2024-11-12 | Stop reason: HOSPADM

## 2024-11-12 RX ORDER — SODIUM CHLORIDE 9 MG/ML
INJECTION, SOLUTION INTRAVENOUS PRN
Status: DISCONTINUED | OUTPATIENT
Start: 2024-11-12 | End: 2024-11-12 | Stop reason: HOSPADM

## 2024-11-12 RX ORDER — SODIUM CHLORIDE 0.9 % (FLUSH) 0.9 %
5-40 SYRINGE (ML) INJECTION EVERY 12 HOURS SCHEDULED
Status: DISCONTINUED | OUTPATIENT
Start: 2024-11-12 | End: 2024-11-12 | Stop reason: HOSPADM

## 2024-11-12 RX ORDER — OXYCODONE HYDROCHLORIDE 5 MG/1
5 TABLET ORAL
Status: DISCONTINUED | OUTPATIENT
Start: 2024-11-12 | End: 2024-11-12 | Stop reason: HOSPADM

## 2024-11-12 RX ORDER — GABAPENTIN 300 MG/1
300 CAPSULE ORAL 3 TIMES DAILY
Status: DISCONTINUED | OUTPATIENT
Start: 2024-11-12 | End: 2024-11-12 | Stop reason: HOSPADM

## 2024-11-12 RX ORDER — PROPOFOL 10 MG/ML
INJECTION, EMULSION INTRAVENOUS
Status: DISCONTINUED | OUTPATIENT
Start: 2024-11-12 | End: 2024-11-12 | Stop reason: SDUPTHER

## 2024-11-12 RX ORDER — ONDANSETRON 2 MG/ML
INJECTION INTRAMUSCULAR; INTRAVENOUS
Status: DISCONTINUED | OUTPATIENT
Start: 2024-11-12 | End: 2024-11-12 | Stop reason: SDUPTHER

## 2024-11-12 RX ORDER — ALBUTEROL SULFATE 0.83 MG/ML
2.5 SOLUTION RESPIRATORY (INHALATION) EVERY 4 HOURS PRN
Status: DISCONTINUED | OUTPATIENT
Start: 2024-11-12 | End: 2024-11-12 | Stop reason: HOSPADM

## 2024-11-12 RX ORDER — METHOCARBAMOL 750 MG/1
750 TABLET, FILM COATED ORAL 4 TIMES DAILY PRN
Qty: 30 TABLET | Refills: 0
Start: 2024-11-12 | End: 2024-11-22

## 2024-11-12 RX ORDER — METHOCARBAMOL 750 MG/1
750 TABLET, FILM COATED ORAL 4 TIMES DAILY PRN
Status: DISCONTINUED | OUTPATIENT
Start: 2024-11-12 | End: 2024-11-12 | Stop reason: HOSPADM

## 2024-11-12 RX ORDER — EPHEDRINE SULFATE/0.9% NACL/PF 50 MG/5 ML
SYRINGE (ML) INTRAVENOUS
Status: DISCONTINUED | OUTPATIENT
Start: 2024-11-12 | End: 2024-11-12 | Stop reason: SDUPTHER

## 2024-11-12 RX ORDER — POLYETHYLENE GLYCOL 3350 17 G/17G
17 POWDER, FOR SOLUTION ORAL DAILY PRN
Status: DISCONTINUED | OUTPATIENT
Start: 2024-11-12 | End: 2024-11-12 | Stop reason: HOSPADM

## 2024-11-12 RX ORDER — SODIUM CHLORIDE 0.9 % (FLUSH) 0.9 %
5-40 SYRINGE (ML) INJECTION PRN
Status: DISCONTINUED | OUTPATIENT
Start: 2024-11-12 | End: 2024-11-12 | Stop reason: HOSPADM

## 2024-11-12 RX ORDER — KETAMINE HCL IN NACL, ISO-OSM 20 MG/2 ML
SYRINGE (ML) INJECTION
Status: DISCONTINUED | OUTPATIENT
Start: 2024-11-12 | End: 2024-11-12 | Stop reason: SDUPTHER

## 2024-11-12 RX ORDER — DEXAMETHASONE SODIUM PHOSPHATE 10 MG/ML
INJECTION, SOLUTION INTRAMUSCULAR; INTRAVENOUS
Status: COMPLETED | OUTPATIENT
Start: 2024-11-12 | End: 2024-11-12

## 2024-11-12 RX ORDER — MIDAZOLAM HYDROCHLORIDE 2 MG/2ML
2 INJECTION, SOLUTION INTRAMUSCULAR; INTRAVENOUS
Status: COMPLETED | OUTPATIENT
Start: 2024-11-12 | End: 2024-11-12

## 2024-11-12 RX ORDER — MIDAZOLAM HYDROCHLORIDE 1 MG/ML
INJECTION, SOLUTION INTRAMUSCULAR; INTRAVENOUS
Status: DISCONTINUED | OUTPATIENT
Start: 2024-11-12 | End: 2024-11-12 | Stop reason: SDUPTHER

## 2024-11-12 RX ORDER — ROPIVACAINE HYDROCHLORIDE 2 MG/ML
INJECTION, SOLUTION EPIDURAL; INFILTRATION; PERINEURAL PRN
Status: DISCONTINUED | OUTPATIENT
Start: 2024-11-12 | End: 2024-11-12 | Stop reason: ALTCHOICE

## 2024-11-12 RX ORDER — ACETAMINOPHEN 325 MG/1
650 TABLET ORAL EVERY 6 HOURS
Status: DISCONTINUED | OUTPATIENT
Start: 2024-11-12 | End: 2024-11-12 | Stop reason: HOSPADM

## 2024-11-12 RX ORDER — SODIUM CHLORIDE, SODIUM LACTATE, POTASSIUM CHLORIDE, CALCIUM CHLORIDE 600; 310; 30; 20 MG/100ML; MG/100ML; MG/100ML; MG/100ML
INJECTION, SOLUTION INTRAVENOUS CONTINUOUS
Status: DISCONTINUED | OUTPATIENT
Start: 2024-11-12 | End: 2024-11-12 | Stop reason: HOSPADM

## 2024-11-12 RX ORDER — ONDANSETRON 2 MG/ML
4 INJECTION INTRAMUSCULAR; INTRAVENOUS EVERY 6 HOURS PRN
Status: DISCONTINUED | OUTPATIENT
Start: 2024-11-12 | End: 2024-11-12 | Stop reason: HOSPADM

## 2024-11-12 RX ORDER — VANCOMYCIN HYDROCHLORIDE 1 G/20ML
INJECTION, POWDER, LYOPHILIZED, FOR SOLUTION INTRAVENOUS PRN
Status: DISCONTINUED | OUTPATIENT
Start: 2024-11-12 | End: 2024-11-12 | Stop reason: ALTCHOICE

## 2024-11-12 RX ORDER — OXYCODONE HYDROCHLORIDE 5 MG/1
5-10 TABLET ORAL EVERY 4 HOURS PRN
Qty: 60 TABLET | Refills: 0
Start: 2024-11-12 | End: 2024-11-17

## 2024-11-12 RX ORDER — THYROID 60 MG/1
60 TABLET ORAL
Status: DISCONTINUED | OUTPATIENT
Start: 2024-11-13 | End: 2024-11-12 | Stop reason: HOSPADM

## 2024-11-12 RX ORDER — LIDOCAINE HYDROCHLORIDE 10 MG/ML
1 INJECTION, SOLUTION INFILTRATION; PERINEURAL
Status: DISCONTINUED | OUTPATIENT
Start: 2024-11-12 | End: 2024-11-12 | Stop reason: HOSPADM

## 2024-11-12 RX ORDER — BUPROPION HYDROCHLORIDE 300 MG/1
300 TABLET ORAL DAILY
Status: DISCONTINUED | OUTPATIENT
Start: 2024-11-13 | End: 2024-11-12 | Stop reason: HOSPADM

## 2024-11-12 RX ORDER — ONDANSETRON 4 MG/1
4 TABLET, ORALLY DISINTEGRATING ORAL EVERY 8 HOURS PRN
Status: DISCONTINUED | OUTPATIENT
Start: 2024-11-12 | End: 2024-11-12 | Stop reason: HOSPADM

## 2024-11-12 RX ADMIN — PHENYLEPHRINE HYDROCHLORIDE 100 MCG: 0.1 INJECTION, SOLUTION INTRAVENOUS at 10:37

## 2024-11-12 RX ADMIN — MIDAZOLAM 1 MG: 1 INJECTION INTRAMUSCULAR; INTRAVENOUS at 09:44

## 2024-11-12 RX ADMIN — Medication 20 MG: at 09:35

## 2024-11-12 RX ADMIN — ONDANSETRON 4 MG: 2 INJECTION INTRAMUSCULAR; INTRAVENOUS at 10:26

## 2024-11-12 RX ADMIN — Medication 10 MG: at 10:26

## 2024-11-12 RX ADMIN — CEFAZOLIN 2000 MG: 2 INJECTION, POWDER, FOR SOLUTION INTRAMUSCULAR; INTRAVENOUS at 09:19

## 2024-11-12 RX ADMIN — MIDAZOLAM 1 MG: 1 INJECTION INTRAMUSCULAR; INTRAVENOUS at 10:20

## 2024-11-12 RX ADMIN — MEPIVACAINE HYDROCHLORIDE 60 MG: 20 INJECTION, SOLUTION EPIDURAL; INFILTRATION at 09:23

## 2024-11-12 RX ADMIN — PROPOFOL 100 MCG/KG/MIN: 10 INJECTION, EMULSION INTRAVENOUS at 09:35

## 2024-11-12 RX ADMIN — MIDAZOLAM 2 MG: 1 INJECTION INTRAMUSCULAR; INTRAVENOUS at 08:36

## 2024-11-12 RX ADMIN — LIDOCAINE HYDROCHLORIDE 60 MG: 20 INJECTION, SOLUTION EPIDURAL; INFILTRATION; INTRACAUDAL; PERINEURAL at 09:35

## 2024-11-12 RX ADMIN — PHENYLEPHRINE HYDROCHLORIDE 100 MCG: 0.1 INJECTION, SOLUTION INTRAVENOUS at 10:54

## 2024-11-12 RX ADMIN — Medication 5 MG: at 10:23

## 2024-11-12 RX ADMIN — PROPOFOL 50 MG: 10 INJECTION, EMULSION INTRAVENOUS at 09:30

## 2024-11-12 RX ADMIN — SODIUM CHLORIDE, SODIUM LACTATE, POTASSIUM CHLORIDE, AND CALCIUM CHLORIDE: 600; 310; 30; 20 INJECTION, SOLUTION INTRAVENOUS at 07:20

## 2024-11-12 RX ADMIN — Medication 5 MG: at 10:04

## 2024-11-12 RX ADMIN — ROPIVACAINE HYDROCHLORIDE 20 ML: 2 INJECTION, SOLUTION EPIDURAL; INFILTRATION at 08:36

## 2024-11-12 RX ADMIN — FENTANYL CITRATE 100 MCG: 50 INJECTION INTRAMUSCULAR; INTRAVENOUS at 08:36

## 2024-11-12 RX ADMIN — TRANEXAMIC ACID 1000 MG: 100 INJECTION, SOLUTION INTRAVENOUS at 09:28

## 2024-11-12 RX ADMIN — SODIUM CHLORIDE, SODIUM LACTATE, POTASSIUM CHLORIDE, AND CALCIUM CHLORIDE: 600; 310; 30; 20 INJECTION, SOLUTION INTRAVENOUS at 10:23

## 2024-11-12 RX ADMIN — PHENYLEPHRINE HYDROCHLORIDE 100 MCG: 0.1 INJECTION, SOLUTION INTRAVENOUS at 10:27

## 2024-11-12 RX ADMIN — DEXAMETHASONE SODIUM PHOSPHATE 4 MG: 10 INJECTION, SOLUTION INTRAMUSCULAR; INTRAVENOUS at 08:36

## 2024-11-12 RX ADMIN — OXYCODONE 10 MG: 5 TABLET ORAL at 14:57

## 2024-11-12 RX ADMIN — PHENYLEPHRINE HYDROCHLORIDE 100 MCG: 0.1 INJECTION, SOLUTION INTRAVENOUS at 10:49

## 2024-11-12 ASSESSMENT — PAIN SCALES - GENERAL
PAINLEVEL_OUTOF10: 4
PAINLEVEL_OUTOF10: 3
PAINLEVEL_OUTOF10: 3

## 2024-11-12 ASSESSMENT — PAIN DESCRIPTION - DESCRIPTORS: DESCRIPTORS: ACHING

## 2024-11-12 ASSESSMENT — PAIN - FUNCTIONAL ASSESSMENT: PAIN_FUNCTIONAL_ASSESSMENT: 0-10

## 2024-11-12 ASSESSMENT — PAIN DESCRIPTION - LOCATION
LOCATION: KNEE
LOCATION: KNEE

## 2024-11-12 ASSESSMENT — PAIN DESCRIPTION - ORIENTATION
ORIENTATION: RIGHT
ORIENTATION: RIGHT

## 2024-11-12 NOTE — ANESTHESIA PROCEDURE NOTES
Peripheral Block    Patient location during procedure: pre-op  Reason for block: post-op pain management and at surgeon's request  Start time: 11/12/2024 8:36 AM  End time: 11/12/2024 8:40 AM  Staffing  Performed: anesthesiologist   Anesthesiologist: Lazaro Serrano MD  Performed by: Lazaro Serrano MD  Authorized by: Lazaro Serrano MD    Preanesthetic Checklist  Completed: patient identified, IV checked, site marked, risks and benefits discussed, surgical/procedural consents, equipment checked, pre-op evaluation, timeout performed, anesthesia consent given, oxygen available and monitors applied/VS acknowledged  Peripheral Block   Patient position: supine  Prep: ChloraPrep  Provider prep: mask and sterile gloves  Patient monitoring: cardiac monitor, continuous pulse ox, frequent blood pressure checks, IV access, oxygen and responsive to questions  Block type: Femoral  Adductor canal  Laterality: right  Injection technique: single-shot  Guidance: ultrasound guided    Needle   Needle type: insulated echogenic nerve stimulator needle   Needle gauge: 20 G  Needle localization: ultrasound guidance  Needle length: 10 cm  Assessment   Injection assessment: negative aspiration for heme, no paresthesia on injection, no intravascular symptoms and local visualized surrounding nerve on ultrasound  Slow fractionated injection: yes  Hemodynamics: stable  Outcomes: patient tolerated procedure well and uncomplicated    Additional Notes  3 cc 1% lidocaine local at needle insertion site.  Risks/benefits/alternatives discussed including damage to muscle or nerve, bleeding, infection, and a reaction to our medications.  Needle inserted and placed in close proximity to the nerve under real time ultrasound guidance.  Ultrasound was used to visualize the spread of local anesthetic in close proximity to the nerve being blocked.  All structures appeared anatomically normal and there were no abnormal findings.  Ultrasound

## 2024-11-12 NOTE — H&P
The patient has end stage arthritis of the right knee. The patient was see and examined and there are no changes to the patient's orthopedic condition. They have tried conservative treatment for this condition; including antiinflammatories and lifestyle modifications and have failed. The necessity for the joint replacement is still present, and the H&P from the office is still current. The patient is admitted today forProcedure(s) (LRB):  KNEE TOTAL ARTHROPLASTY ROBOTIC-right (Right) .

## 2024-11-12 NOTE — ANESTHESIA PROCEDURE NOTES
Spinal Block    Patient location during procedure: OR  End time: 11/12/2024 9:27 AM  Reason for block: primary anesthetic  Staffing  Performed: anesthesiologist   Anesthesiologist: Lazaro Serrano MD  Performed by: Lazaro Serrano MD  Authorized by: Lazaro Serrano MD    Spinal Block  Patient position: sitting  Prep: ChloraPrep  Patient monitoring: cardiac monitor, continuous pulse ox and frequent blood pressure checks  Approach: midline  Location: L3/L4  Provider prep: mask and sterile gloves  Needle  Needle type: pencil-tip   Needle gauge: 25 G  Needle length: 3.5 in  Assessment  Events: SAB placement uncomplicated.  No paresthesia.  Swirl obtained: Yes  CSF: clear  Attempts: 1  Hemodynamics: stable  Additional Notes  3 cc 1% lidocaine local injected at needle insertion site.  Risks/benefits/alternatives discussed including damage to muscle or nerve, bleeding, infection, and a reaction to our medications.    Preanesthetic Checklist  Completed: patient identified, IV checked, risks and benefits discussed, equipment checked, pre-op evaluation, timeout performed, anesthesia consent given, oxygen available and monitors applied/VS acknowledged

## 2024-11-12 NOTE — ANESTHESIA POSTPROCEDURE EVALUATION
Department of Anesthesiology  Postprocedure Note    Patient: Iraida Montalvo  MRN: [id number]  YOB: 1965  Date of evaluation: 11/12/2024    Procedure Summary       Date: 11/12/24 Room / Location: [facility] MAIN OR 06 / [facility] MAIN OR    Anesthesia Start: 0919 Anesthesia Stop: 1101    Procedure: KNEE TOTAL ARTHROPLASTY ROBOTIC-right (Right: Knee) Diagnosis:       Osteoarthritis of right knee      (Osteoarthritis of right knee [M17.11])    Surgeons: Gokul Bear MD Responsible Provider: Lazaro Serrano MD    Anesthesia Type: spinal ASA Status: 2            Anesthesia Type: No value filed.    Kota Phase I: Kota Score: 9    Kota Phase II:      Anesthesia Post Evaluation    Patient location during evaluation: PACU  Patient participation: complete - patient participated  Level of consciousness: awake and alert  Airway patency: patent  Nausea & Vomiting: no nausea and no vomiting  Cardiovascular status: hemodynamically stable  Respiratory status: acceptable, nonlabored ventilation and spontaneous ventilation  Hydration status: euvolemic  Comments: /67   Pulse 85   Temp 98.1 °F (36.7 °C) (Infrared)   Resp 16   Ht 1.626 m (5' 4\")   Wt 65 kg (143 lb 3.2 oz)   SpO2 99%   BMI 24.58 kg/m²   Informed by CRNA upon dropping pt off in PACU that he noted worse hypotension following hammering on femur.  Pt seen in PACU, initial /67, follow up BP 90/60.  Improved to 140/87 with Phenylephrine 100 mcg IV x1.  Also given 500 cc IVF bolus.  Pt appears very comfortable and is in NAD, respirations unlabored and at baseline, no CP.  BP stable and in acceptable range after intervention.  Multimodal analgesia pain management approach  Pain management: adequate and satisfactory to patient    No notable events documented.

## 2024-11-12 NOTE — DISCHARGE INSTRUCTIONS
[facility]      Patient Discharge Instructions    Iraida Montalvo/[id number] : 1965    Admitted 2024 Discharged: 2024       IF YOU HAVE ANY PROBLEMS ONCE YOU ARE AT HOME CALL THE FOLLOWING NUMBERS:   Main office number: [phone]      Medications    The medications you are to continue on are listed on the medication reconciliation sheet.   Narcotic pain medications as well as supplemental iron can cause constipation. If this occurs try stopping the narcotic pain medication and/or the iron.   It is important that you take the medication exactly as they are prescribed.  Medications which increase your risk of blood clots are listed to stop for 5 weeks after surgery as well as medications or supplements which increase your risk of bleeding complications.   Keep your medication in the bottles provided by the pharmacist and keep a list of the medication names, dosages, and times to be taken in your wallet.   Do not take other medications without consulting your doctor.       Important Information    Do NOT smoke as this will greatly increase your risk of infection!    Resume your prehospital diet. If you have excessive nausea or vomitting call your doctor's office     Leg swelling and warmth is normal for 6 months after surgery. If you experience swelling in your leg elevate you leg while laying down with your toes above your heart. If you have sudden onset severe swelling with leg pain call our office. Use Francisco Hose stockings until we see you in the office for your follow up appointment.    The stitches deep inside take approximately 6 months to dissolve. There will be sharp shooting, stinging and burning pain. This is normal and will resolve between 3-6 months after surgery.     Difficulty sleeping is normal following total Knee and Hip replacement. You may try melatonin, an over-the-counter sleep aid or benadryl to help with sleep. Most patients will resume sleeping through the night 8

## 2024-11-12 NOTE — PROGRESS NOTES
11/12/24 1536   Oxygen Therapy/Pulse Ox   O2 Therapy Room air   O2 Device None (Room air)   O2 Flow Rate (L/min) 0 L/min   Pulse 76   SpO2 96 %     Patient achieved 3000 MI/sec on IS. Patient encouraged to do 10 breaths every hour while awake-patient agreed and demonstrated. No shortness of breath or distress noted. BS are clear b/l.     
4 Eyes Skin Assessment     NAME:  Iraida Montalvo  YOB: 1965  MEDICAL RECORD NUMBER:  [id number]    The patient is being assessed for  Admission    I agree that at least one RN has performed a thorough Head to Toe Skin Assessment on the patient. ALL assessment sites listed below have been assessed.      Areas assessed by both nurses:    Head, Face, Ears, Shoulders, Back, Chest, Arms, Elbows, Hands, and Legs. Feet and Heels        Does the Patient have a Wound? No noted wound(s)       Naveen Prevention initiated by RN: Yes  Wound Care Orders initiated by RN: No    Pressure Injury (Stage 3,4, Unstageable, DTI, NWPT, and Complex wounds) if present, place Wound referral order by RN under : No    New Ostomies, if present place, Ostomy referral order under : No     Nurse 1 eSignature: Electronically signed by Shanika Barreto RN on 11/12/24 at 1:51 PM EST    **SHARE this note so that the co-signing nurse can place an eSignature**    Nurse 2 eSignature: Electronically signed by becky cunningham RN on 11/12/24 at 1:51 PM EST   
ACUTE PHYSICAL THERAPY GOALS:   (Developed with and agreed upon by patient and/or caregiver.)  GOALS (1-4 days):  (1.)Ms. Montalvo will move from supine to sit and sit to supine  in bed with INDEPENDENT.  (2.)Ms. Montalvo will transfer from bed to chair and chair to bed with INDEPENDENT using the least restrictive device.  (3.)Ms. Montalvo will ambulate with INDEPENDENT for 200 feet with the least restrictive device.  (4.)Ms. Montalvo will ambulate up/down 3 steps with bilateral  railing with MINIMAL ASSIST.  (5.)Ms. Montalvo will increase right knee ROM to 0-90°.  ________________________________________________________________________________________________            PHYSICAL THERAPY Initial Assessment, Daily Note, and PM  (Link to Caseload Tracking: PT Visit Days : 1  Acknowledge Orders  Time In/Out  PT Charge Capture  Rehab Caseload Tracker    Iraida Montalvo is a 59 y.o. female   PRIMARY DIAGNOSIS: Osteoarthritis of right knee  Osteoarthritis of right knee [M17.11]  Osteoarthritis of right knee, unspecified osteoarthritis type [M17.11]  Procedure(s) (LRB):  KNEE TOTAL ARTHROPLASTY ROBOTIC-right (Right)  Day of Surgery  Reason for Referral: Pain in Right Knee (M25.561)  Stiffness of Right Knee, Not elsewhere classified (M25.661)  Difficulty in walking, Not elsewhere classified (R26.2)  Other abnormalities of gait and mobility (R26.89)  Outpatient in a bed: Payor:  [facility] / Plan:  [facility] / Product Type: *No Product type* /     ASSESSMENT:     REHAB RECOMMENDATIONS:   Recommendation to date pending progress:  Setting:  Home Health Therapy    Equipment:    Rolling Walker     ASSESSMENT:  Ms. Montalvo presents with expected decreased strength and range of motion right lower extremity and with decreased independence with functional mobility s/p right total knee arthroplasty. Pt will benefit from skilled PT interventions to maximize independence with functional mobility and TKA management. Pt did well with assessment and 
TRANSFER - IN REPORT:    Verbal report received from SANGEETA Sultana on Iraida Montalvo  being received from PACU for routine post-op      Report consisted of patient's Situation, Background, Assessment and   Recommendations(SBAR).     Information from the following report(s) Nurse Handoff Report was reviewed with the receiving nurse.    Opportunity for questions and clarification was provided.      Assessment completed upon patient's arrival to unit and care assumed.    
ntains abnormal data Hemoglobin A1c  Order: [id number]  Status: Final result       Visible to patient: Yes (seen)       Next appt: 10/31/2024 at 01:00 PM in Orthopedic Surgery (Gokul Bear MD)       Dx: Preop examination    0 Result Notes       2  Topics      Component  Ref Range & Units 10/28/24 0949   Hemoglobin A1C  0 - 5.6 % 5.8 High    Comment: Reference Range  Normal       <5.7%  Prediabetes  5.7-6.4%  Diabetes     >6.4%   Estimated Avg Glucose  mg/dL 119   Resulting Agency [facility]              Specimen Collected: 10/28/24 09:49 EDT Last Resulted: 10/28/24 12:44 EDT        
Education  (Reference education tab)    [x] Safe And Effective Hygiene  [x] Fall Precautions  [x] Precautions  [x] D/C Instruction Review [x] Self Care Training and Home Safety  [x] Walker Management/Safety  [x] Adaptive Equipment as Needed  [x] Therapeutic Resting Position of Joint     TOTAL TREATMENT DURATION AND TIME:  Time In: 1410  Time Out: 1450  Minutes: 40    Kathleen Singer, OT

## 2024-11-12 NOTE — CARE COORDINATION
CM note:    Chart reviewed for updates. CM met with pt at bedside, introduced role, confirmed demographics and discussed d/c planning. Patient is a 59 y.o. year old female admitted for Right TKA . Patient plans to return home on discharge. Order received to arrange home health. Patient without preference towards agency. Referral sent to [facility]. Patient denies any equipment needs as patient has a walker and cane. Patient lives with spouse in a house. She is insured and has a PCP that she sees every 6 months. Pt is scheduled to discharge today. Spouse will provide transport. No further CM needs identified. CM will remain accessible for consult incase additional CM needs arise prior d/c.     11/12/24 7202   Service Assessment   Patient Orientation Alert and Oriented   Cognition Alert   History Provided By Patient   Primary Caregiver Self   Accompanied By/Relationship N/A   Support Systems Spouse/Significant Other   Patient's Healthcare Decision Maker is: Legal Next of Kin  (Spouse)   PCP Verified by CM Yes   Last Visit to PCP Within last 6 months   Prior Functional Level Independent in ADLs/IADLs   Current Functional Level Independent in ADLs/IADLs   Financial Resources Other (Comment)  ([facility])   Social/Functional History   Home Equipment Cane;Walker - Rolling   ADL Assistance Independent   Ambulation Assistance Independent   Discharge Planning   Type of Residence House   Current Services Prior To Admission Durable Medical Equipment   Current DME Prior to Arrival Cane;Walker   Potential Assistance Purchasing Medications No   Type of Home Care Services PT   Patient expects to be discharged to: House   Services At/After Discharge   Transition of Care Consult (CM Consult) Home Health   Internal Home Health No   Reason Outside Agency Chosen Out of service area   Services At/After Discharge Home Health;PT    Resource Information Provided? No   Mode of Transport at Discharge Other (see

## 2024-11-12 NOTE — FLOWSHEET NOTE
11/12/24 1559   AVS Reviewed   AVS & discharge instructions reviewed with patient and/or representative? Yes   Reviewed instructions with Patient   Level of Understanding Questions answered     Iv removed without complications.

## 2024-11-12 NOTE — PERIOP NOTE
MD Serrano at bedside with patient. Pt VSS stable. Pain and Nausea controlled at this time. Verbal sign out per MD when PACU care is completed. Plan of care continues.

## 2024-11-12 NOTE — PERIOP NOTE
TRANSFER - OUT REPORT:    Verbal report given to Shanika Hurtado RN on Iraida Montalvo  being transferred to [facility] for routine progression of patient care       Report consisted of patient's Situation, Background, Assessment and   Recommendations(SBAR).     Information from the following report(s) Nurse Handoff Report was reviewed with the receiving nurse.           Lines:   Peripheral IV 11/12/24 Left Hand (Active)   Site Assessment Clean, dry & intact 11/12/24 1101   Line Status Infusing 11/12/24 1101   Line Care Connections checked and tightened 11/12/24 1101   Phlebitis Assessment No symptoms 11/12/24 1101   Infiltration Assessment 0 11/12/24 1101   Alcohol Cap Used No 11/12/24 0719   Dressing Status Intact w/seal 11/12/24 1101   Dressing Type Transparent 11/12/24 1101   Dressing Intervention New 11/12/24 0719        Opportunity for questions and clarification was provided.      Patient transported with:  O2 @ 0lpm

## 2024-11-12 NOTE — ACP (ADVANCE CARE PLANNING)
Advance Care Planning   General Advance Care Planning (ACP) Conversation    Date of Conversation: 9/18/2024  Conducted with: Patient with Decision Making Capacity  Other persons present: None    Healthcare Decision Maker:    Primary Decision Maker: key scanlon - [facility] - [phone]        Length of Voluntary ACP Conversation in minutes:  <16 minutes (Non-Billable)    Leslee Velasquez

## 2024-11-12 NOTE — OP NOTE
[facility]  Pilo Robotic Assisted Total Knee Arthroplasty: Posterior Cruciate Retaining       Patient:Iraida Montalvo   : 1965  Medical Record Number:[id number]      Pre-operative Diagnosis:  right Knee Arthritis Osteoarthritis of right knee [M17.11]  Post-operative Diagnosis: Same  Location: [facility]    Date of Procedure: 2024  Surgeon: Gokul Bear MD  Assistant:  CODY Polk    Anesthesia: Spinal and  nerve block  BMI:Body mass index is 24.58 kg/m².      CPT- 76970- Total knee arthroplasty           93390- Other procedures on musculoskeletal system               Procedure:  Right Cementless Cruciate Retained Total Knee Arthroplasty     Tourniquet Time: none    EBL:  100 cc         The complexity of the total joint surgery requires the use of a first assistant for positioning, retraction and assistance in closure.  CODY Anderson was this assistant.    Iraida Montalvo was brought to the operating room and positioned on the operating table.  She was anesthestized with anesthesia. IV antibiotics was administered. Prior to the incision being made a timeout was called identifying the patient, procedure ,operative side and surgeon The operative leg was prepped and draped in the usual sterile manner.  An anterior longitudinal incision was accomplished just medial to the tibial tubercle and extending approximal 6 centimeters proximal to the superior pole of the patella over the  knee.  A medial parapatellar capsular incision was performed. The medial capsular flap was elevated around to the insertion of the semimembranous tendon.  The patella was everted and the knee flexed and externally rotated.  The medial and external menisci were excised.  The lateral half of the fat pad excised and the patella femoral ligament was released.  The anterior cruciate ligament was resected and the posterior cruciate ligament was retained.      The femoral and tibial arrays

## 2024-11-12 NOTE — ANESTHESIA PRE PROCEDURE
is 24.58 kg/m².    CBC:   Lab Results   Component Value Date/Time    WBC 8.5 10/28/2024 09:49 AM    RBC 5.64 10/28/2024 09:49 AM    HGB 16.2 10/28/2024 09:49 AM    HCT 50.7 10/28/2024 09:49 AM    MCV 89.9 10/28/2024 09:49 AM    RDW 12.5 10/28/2024 09:49 AM     10/28/2024 09:49 AM       CMP:   Lab Results   Component Value Date/Time     10/28/2024 09:49 AM    K 4.9 10/28/2024 09:49 AM    CL 97 10/28/2024 09:49 AM    CO2 28 10/28/2024 09:49 AM    BUN 18 10/28/2024 09:49 AM    CREATININE 0.96 10/28/2024 09:49 AM    LABGLOM 68 10/28/2024 09:49 AM    GLUCOSE 98 10/28/2024 09:49 AM    CALCIUM 9.7 10/28/2024 09:49 AM    BILITOT <0.2 10/28/2024 09:49 AM    ALKPHOS 60 10/28/2024 09:49 AM    AST 19 10/28/2024 09:49 AM    ALT 27 10/28/2024 09:49 AM       POC Tests: No results for input(s): \"POCGLU\", \"POCNA\", \"POCK\", \"POCCL\", \"POCBUN\", \"POCHEMO\", \"POCHCT\" in the last 72 hours.    Coags:   Lab Results   Component Value Date/Time    PROTIME 12.3 10/28/2024 09:49 AM    INR 0.9 10/28/2024 09:49 AM    APTT 28.3 10/28/2024 09:49 AM       HCG (If Applicable): No results found for: \"PREGTESTUR\", \"PREGSERUM\", \"HCG\", \"HCGQUANT\"     ABGs: No results found for: \"PHART\", \"PO2ART\", \"WPA8DXK\", \"MTL3DNF\", \"BEART\", \"H0ITLAUJ\"     Type & Screen (If Applicable):  No results found for: \"ABORH\", \"LABANTI\"    Drug/Infectious Status (If Applicable):  No results found for: \"HIV\", \"HEPCAB\"    COVID-19 Screening (If Applicable): No results found for: \"COVID19\"        Anesthesia Evaluation    Airway: Mallampati: I  TM distance: >3 FB   Neck ROM: full  Mouth opening: > = 3 FB   Dental: normal exam         Pulmonary:Negative Pulmonary ROS and normal exam  breath sounds clear to auscultation  (+)           asthma (rare sxs): allergic asthma,                            Cardiovascular:Negative CV ROS  Exercise tolerance: good (>4 METS), Very active  (+) hypertension:        Rhythm: regular  Rate: normal                    Neuro/Psych:   Negative

## 2024-11-13 ENCOUNTER — TELEPHONE (OUTPATIENT)
Dept: ORTHOPEDICS UNIT | Age: 59
End: 2024-11-13

## 2024-11-13 NOTE — TELEPHONE ENCOUNTER
Called to follow up after TKA with SDD on 11/12/24.  No answer.  VM left with contact number for ortho navigator.

## 2024-11-14 ENCOUNTER — TELEPHONE (OUTPATIENT)
Dept: ORTHOPEDICS UNIT | Age: 59
End: 2024-11-14

## 2024-11-14 DIAGNOSIS — Z96.651 S/P TOTAL KNEE ARTHROPLASTY, RIGHT: Primary | ICD-10-CM

## 2024-11-14 RX ORDER — HYDROMORPHONE HYDROCHLORIDE 2 MG/1
2 TABLET ORAL
Qty: 30 TABLET | Refills: 0 | Status: SHIPPED | OUTPATIENT
Start: 2024-11-14 | End: 2024-11-19

## 2024-11-14 NOTE — TELEPHONE ENCOUNTER
Spoke with patient d/t request from [facility].  Patient is 2 days s/p TKA.  Reports chest pain after taking oxycodone.  Chest pain resolved in 30 minutes. Denies cardiac history.  Patient reports a history of \" anxiety with some narcotics.\"  Has been taking zofran 15 minutes prior to oxycodone.  Reports \"no chest pain today--it's better.\"  Denied offer for narcotic change request.  Wants to continue with oxycodone for now.  Will contact Dr. Bear' office if decides she wants to request a different narcotic.

## 2024-11-15 DIAGNOSIS — Z96.651 S/P TOTAL KNEE ARTHROPLASTY, RIGHT: Primary | ICD-10-CM

## 2024-12-11 ENCOUNTER — OFFICE VISIT (OUTPATIENT)
Dept: ORTHOPEDIC SURGERY | Age: 59
End: 2024-12-11

## 2024-12-11 DIAGNOSIS — Z96.651 S/P TOTAL KNEE ARTHROPLASTY, RIGHT: Primary | ICD-10-CM

## 2024-12-11 RX ORDER — METHOCARBAMOL 750 MG/1
750 TABLET, FILM COATED ORAL 4 TIMES DAILY PRN
Qty: 20 TABLET | Refills: 0 | Status: SHIPPED | OUTPATIENT
Start: 2024-12-11 | End: 2024-12-21

## 2024-12-11 NOTE — PROGRESS NOTES
Name: Iraida Montalvo  YOB: 1965  Gender: female  MRN: [id number]      Current Outpatient Medications:     aspirin 81 MG EC tablet, Take 1 tablet by mouth 2 times daily, Disp: 70 tablet, Rfl: 0    ondansetron (ZOFRAN) 4 MG tablet, Take 1 tablet by mouth every 8 hours as needed for Nausea or Vomiting, Disp: 20 tablet, Rfl: 0    Melatonin 12 MG TABS, Take 1 tablet by mouth at bedtime, Disp: , Rfl:     diphenhydrAMINE (BENADRYL) 25 MG capsule, Take 1 capsule by mouth nightly as needed for Itching or Allergies, Disp: , Rfl:     desloratadine-pseudoephedrine (CLARINEX-D 12 HOUR) 2.5-120 MG per extended release tablet, Take 1 tablet by mouth 2 times daily, Disp: , Rfl:     olmesartan (BENICAR) 40 MG tablet, Take 1 tablet by mouth at bedtime, Disp: , Rfl:     thyroid (ARMOUR) 60 MG tablet, Take 1 tablet by mouth daily, Disp: , Rfl:     acetaminophen (TYLENOL) 500 MG tablet, Take 1 tablet by mouth every 6 hours as needed for Pain, Disp: , Rfl:     amLODIPine (NORVASC) 2.5 MG tablet, Take 1 tablet by mouth daily, Disp: , Rfl:     albuterol sulfate HFA (PROVENTIL;VENTOLIN;PROAIR) 108 (90 Base) MCG/ACT inhaler, Inhale 2 puffs into the lungs every 4 hours as needed, Disp: , Rfl:     buPROPion (WELLBUTRIN SR) 150 MG extended release tablet, Take 2 tablets by mouth daily, Disp: , Rfl:     gabapentin (NEURONTIN) 300 MG capsule, Take 1 capsule by mouth 3 times daily., Disp: , Rfl:   Allergies   Allergen Reactions    Latex Hives     Blisters    Nickel     Sulfa Antibiotics Itching and Swelling    Lortab [Hydrocodone-Acetaminophen] Itching, Nausea And Vomiting and Anxiety    Shellfish-Derived Products Swelling and Rash       Post-op right TKA    The patient returns now 4 weeks post right total knee arthroplasty.  Their pain is diminishing and the wound healing.   Their range of motion is improving.    Radiographs: AP/Lateral and sunrise of the right knee taken in the office today reveal a good bone, prosthetic appearance.

## 2025-02-12 ENCOUNTER — OFFICE VISIT (OUTPATIENT)
Dept: ORTHOPEDIC SURGERY | Age: 60
End: 2025-02-12
Payer: OTHER GOVERNMENT

## 2025-02-12 DIAGNOSIS — M75.101 ROTATOR CUFF TEAR ARTHROPATHY OF RIGHT SHOULDER: Primary | ICD-10-CM

## 2025-02-12 DIAGNOSIS — M12.811 ROTATOR CUFF TEAR ARTHROPATHY OF RIGHT SHOULDER: Primary | ICD-10-CM

## 2025-02-12 PROCEDURE — 20610 DRAIN/INJ JOINT/BURSA W/O US: CPT | Performed by: PHYSICIAN ASSISTANT

## 2025-02-12 RX ORDER — METHYLPREDNISOLONE ACETATE 80 MG/ML
80 INJECTION, SUSPENSION INTRA-ARTICULAR; INTRALESIONAL; INTRAMUSCULAR; SOFT TISSUE ONCE
Status: COMPLETED | OUTPATIENT
Start: 2025-02-12 | End: 2025-02-12

## 2025-02-12 RX ADMIN — METHYLPREDNISOLONE ACETATE 80 MG: 80 INJECTION, SUSPENSION INTRA-ARTICULAR; INTRALESIONAL; INTRAMUSCULAR; SOFT TISSUE at 09:05

## 2025-02-12 NOTE — PROGRESS NOTES
[facility]          Patient ID:  Name: Iraida Montalvo  AGE/Gender: 59 y.o. female  MRN: [id number]  : 1965    Date of Consultation:  2025    ALLERGIES:   Allergies   Allergen Reactions    Latex Hives     Blisters    Nickel     Sulfa Antibiotics Itching and Swelling    Lortab [Hydrocodone-Acetaminophen] Itching, Nausea And Vomiting and Anxiety    Shellfish-Derived Products Swelling and Rash        Diagnosis: Osteoarthritis right shoulder      PROCEDURE:  Depo-Medrol Injection right shoulder        The procedure was explained to the patient and possible adverse reactions were discussed.      TIME OUT performed immediately prior to start of procedure:   ILance PA, have performed the following reviews on Iraida Montalvo prior to the start of the procedure:            * Patient was identified by name and date of birth   * Agreement on procedure being performed was verified  * Risks and Benefits explained to the patient  * Procedure site verified and marked as necessary  * Patient was positioned for comfort    After the area was prepped and cleansed in sterile fashion with chlorhexidine and alcohol a solution of 4.5cc of 2% xylocaine, 4.5cc of 0.5% bupivacaine and 1cc of 80mg of depomedrol was injected into the  *Right Subacromial space   .     How tolerated by patient: tolerated the procedure well with no complications    Post injection instructions were given to Iraida KATHERINE Krishnagordon:       Electronically signed by:   CODY Low  2025,  9:05 AM

## 2025-05-13 ENCOUNTER — OFFICE VISIT (OUTPATIENT)
Dept: ORTHOPEDIC SURGERY | Age: 60
End: 2025-05-13
Payer: OTHER GOVERNMENT

## 2025-05-13 DIAGNOSIS — M75.101 ROTATOR CUFF TEAR ARTHROPATHY OF RIGHT SHOULDER: Primary | ICD-10-CM

## 2025-05-13 DIAGNOSIS — M12.811 ROTATOR CUFF TEAR ARTHROPATHY OF RIGHT SHOULDER: Primary | ICD-10-CM

## 2025-05-13 PROCEDURE — 20610 DRAIN/INJ JOINT/BURSA W/O US: CPT | Performed by: PHYSICIAN ASSISTANT

## 2025-05-13 RX ORDER — METHYLPREDNISOLONE ACETATE 80 MG/ML
80 INJECTION, SUSPENSION INTRA-ARTICULAR; INTRALESIONAL; INTRAMUSCULAR; SOFT TISSUE ONCE
Status: COMPLETED | OUTPATIENT
Start: 2025-05-13 | End: 2025-05-13

## 2025-05-13 RX ADMIN — METHYLPREDNISOLONE ACETATE 80 MG: 80 INJECTION, SUSPENSION INTRA-ARTICULAR; INTRALESIONAL; INTRAMUSCULAR; SOFT TISSUE at 11:17

## 2025-05-13 NOTE — PROGRESS NOTES
[facility]          Patient ID:  Name: Iraida Montalvo  AGE/Gender: 60 y.o. female  MRN: [id number]  : 1965    Date of Consultation:  May 13, 2025    ALLERGIES:   Allergies   Allergen Reactions    Latex Hives     Blisters    Nickel     Sulfa Antibiotics Itching and Swelling    Lortab [Hydrocodone-Acetaminophen] Itching, Nausea And Vomiting and Anxiety    Shellfish-Derived Products Swelling and Rash        Diagnosis: Rotator cuff tear arthropathy right shoulder      PROCEDURE:  Depo-Medrol Injection right shoulder        The procedure was explained to the patient and possible adverse reactions were discussed.      TIME OUT performed immediately prior to start of procedure:   ILance PA, have performed the following reviews on Iraida Montalvo prior to the start of the procedure:            * Patient was identified by name and date of birth   * Agreement on procedure being performed was verified  * Risks and Benefits explained to the patient  * Procedure site verified and marked as necessary  * Patient was positioned for comfort    After the area was prepped and cleansed in sterile fashion with chlorhexidine and alcohol a solution of 4.5cc of 2% xylocaine, 4.5cc of 0.5% bupivacaine and 1cc of 80mg of depomedrol was injected into the  *Right Subacromial space   .     How tolerated by patient: tolerated the procedure well with no complications    Post injection instructions were given to Iraida Montalvo:       Electronically signed by:   CODY Low  2025,  11:16 AM

## 2025-05-14 ENCOUNTER — OFFICE VISIT (OUTPATIENT)
Dept: ORTHOPEDIC SURGERY | Age: 60
End: 2025-05-14
Payer: OTHER GOVERNMENT

## 2025-05-14 DIAGNOSIS — Z96.651 S/P TOTAL KNEE ARTHROPLASTY, RIGHT: Primary | ICD-10-CM

## 2025-05-14 PROCEDURE — 99213 OFFICE O/P EST LOW 20 MIN: CPT | Performed by: PHYSICIAN ASSISTANT

## 2025-05-14 NOTE — PROGRESS NOTES
Name: Iraida Montalvo  YOB: 1965  Gender: female  MRN: [id number]      Current Outpatient Medications:     aspirin 81 MG EC tablet, Take 1 tablet by mouth 2 times daily, Disp: 70 tablet, Rfl: 0    ondansetron (ZOFRAN) 4 MG tablet, Take 1 tablet by mouth every 8 hours as needed for Nausea or Vomiting, Disp: 20 tablet, Rfl: 0    Melatonin 12 MG TABS, Take 1 tablet by mouth at bedtime, Disp: , Rfl:     diphenhydrAMINE (BENADRYL) 25 MG capsule, Take 1 capsule by mouth nightly as needed for Itching or Allergies, Disp: , Rfl:     desloratadine-pseudoephedrine (CLARINEX-D 12 HOUR) 2.5-120 MG per extended release tablet, Take 1 tablet by mouth 2 times daily, Disp: , Rfl:     olmesartan (BENICAR) 40 MG tablet, Take 1 tablet by mouth at bedtime, Disp: , Rfl:     thyroid (ARMOUR) 60 MG tablet, Take 1 tablet by mouth daily, Disp: , Rfl:     acetaminophen (TYLENOL) 500 MG tablet, Take 1 tablet by mouth every 6 hours as needed for Pain, Disp: , Rfl:     amLODIPine (NORVASC) 2.5 MG tablet, Take 1 tablet by mouth daily, Disp: , Rfl:     albuterol sulfate HFA (PROVENTIL;VENTOLIN;PROAIR) 108 (90 Base) MCG/ACT inhaler, Inhale 2 puffs into the lungs every 4 hours as needed, Disp: , Rfl:     buPROPion (WELLBUTRIN SR) 150 MG extended release tablet, Take 2 tablets by mouth daily, Disp: , Rfl:     gabapentin (NEURONTIN) 300 MG capsule, Take 1 capsule by mouth 3 times daily., Disp: , Rfl:   Allergies   Allergen Reactions    Latex Hives     Blisters    Nickel     Sulfa Antibiotics Itching and Swelling    Lortab [Hydrocodone-Acetaminophen] Itching, Nausea And Vomiting and Anxiety    Shellfish-Derived Products Swelling and Rash       CC: Post-op right TKA    HPI: The patient is here now 6 months post right total knee arthroplasty.  She reports that she is functioning well and denies knee pain, swelling, or instability.  She reports some occasional soreness in the hamstrings region.  She has remained quite active with biking

## 2025-08-05 ENCOUNTER — OFFICE VISIT (OUTPATIENT)
Dept: ORTHOPEDIC SURGERY | Age: 60
End: 2025-08-05
Payer: OTHER GOVERNMENT

## 2025-08-05 DIAGNOSIS — S46.111A RUPTURE LONG HEAD BICEPS TENDON, RIGHT, INITIAL ENCOUNTER: ICD-10-CM

## 2025-08-05 DIAGNOSIS — M75.101 ROTATOR CUFF TEAR ARTHROPATHY OF RIGHT SHOULDER: Primary | ICD-10-CM

## 2025-08-05 DIAGNOSIS — M19.011 DEGENERATIVE JOINT DISEASE OF RIGHT ACROMIOCLAVICULAR JOINT: ICD-10-CM

## 2025-08-05 DIAGNOSIS — M12.811 ROTATOR CUFF TEAR ARTHROPATHY OF RIGHT SHOULDER: Primary | ICD-10-CM

## 2025-08-05 PROCEDURE — 20610 DRAIN/INJ JOINT/BURSA W/O US: CPT | Performed by: ORTHOPAEDIC SURGERY

## 2025-08-05 RX ORDER — METHYLPREDNISOLONE ACETATE 80 MG/ML
80 INJECTION, SUSPENSION INTRA-ARTICULAR; INTRALESIONAL; INTRAMUSCULAR; SOFT TISSUE ONCE
Status: COMPLETED | OUTPATIENT
Start: 2025-08-05 | End: 2025-08-05

## 2025-08-05 RX ADMIN — METHYLPREDNISOLONE ACETATE 80 MG: 80 INJECTION, SUSPENSION INTRA-ARTICULAR; INTRALESIONAL; INTRAMUSCULAR; SOFT TISSUE at 16:45

## (undated) DEVICE — PIN BNE FIX TEMP L110MM DIA4MM MAKO

## (undated) DEVICE — SYRINGE MED 50ML LUERLOCK TIP

## (undated) DEVICE — SUTURE MONOCRYL SZ 2-0 L27IN ABSRB UD CP-1 1 L36MM 1/2 CIR REV Y266H

## (undated) DEVICE — SUTURE ABS ANTIBACT 1-0 CTX 24IN STRATAFIX PDS+ SXPP1A445

## (undated) DEVICE — KIT INT FIX FEM TIB CKPT MAKOPLASTY

## (undated) DEVICE — GLOVE SURG SZ 8 L12IN FNGR THK79MIL GRN LTX FREE

## (undated) DEVICE — NEEDLE HYPO 21GA L1.5IN INTRAMUSCULAR S STL LATCH BVL UP

## (undated) DEVICE — SUTURE MONOCRYL STRATAFIX SPRL + SZ 2-0 L18IN ABSRB UD CT-1 SXMP1B413

## (undated) DEVICE — GLOVE SURG SZ 7 L12IN FNGR THK79MIL GRN LTX FREE

## (undated) DEVICE — DRESSING HYDROFIBER AQUACEL AG ADVANTAGE 3.5X10 IN

## (undated) DEVICE — HOOD: Brand: T7PLUS

## (undated) DEVICE — SOLUTION IRRIG 1000ML 0.9% SOD CHL USP POUR PLAS BTL

## (undated) DEVICE — SOLUTION WND IRRIGATION 450 ML 0.5 PVP-I 0.9 NACL

## (undated) DEVICE — GLOVE SURG SZ 9 THK91MIL LTX FREE SYN POLYISOPRENE ANTI

## (undated) DEVICE — SUTURE VICRYL + SZ 1-0 L36IN ABSRB UD CTX 1/2 CIR TAPR PNT VCP977H

## (undated) DEVICE — 450 ML BOTTLE OF 0.05% CHLORHEXIDINE GLUCONATE IN 99.95% STERILE WATER FOR IRRIGATION, USP AND APPLICATOR.: Brand: IRRISEPT ANTIMICROBIAL WOUND LAVAGE

## (undated) DEVICE — BLADE SURG SAW S STL NAR OSC W/ SERR EDGE DISP

## (undated) DEVICE — SOLUTION IV 250ML 0.9% SOD CHL PH 5 INJ USP VIAFLX PLAS

## (undated) DEVICE — GLOVE SURG SZ 65 THK91MIL LTX FREE SYN POLYISOPRENE

## (undated) DEVICE — KIT DRP FOR RIO ROBOTIC ARM ASST SYS

## (undated) DEVICE — BIPOLAR SEALER 23-112-1 AQM 6.0: Brand: AQUAMANTYS ®

## (undated) DEVICE — NEEDLE HYPO 18GA L1.5IN PNK S STL HUB POLYPR SHLD REG BVL

## (undated) DEVICE — GLOVE ORANGE PI 7 1/2   MSG9075

## (undated) DEVICE — TOTAL KNEE: Brand: MEDLINE INDUSTRIES, INC.

## (undated) DEVICE — SYRINGE MED 10ML LUERLOCK TIP W/O SFTY DISP

## (undated) DEVICE — KIT TRK KNEE PROC VIZADISC

## (undated) DEVICE — PIN BNE FIX TEMP L140MM DIA4MM MAKO

## (undated) DEVICE — STERILE PRESSURE PROTECTOR PAD® FOR DE MAYO UNIVERSAL DISTRACTOR® (10/CASE): Brand: DE MAYO UNIVERSAL DISTRACTOR®